# Patient Record
Sex: FEMALE | Race: WHITE | NOT HISPANIC OR LATINO | Employment: OTHER | URBAN - METROPOLITAN AREA
[De-identification: names, ages, dates, MRNs, and addresses within clinical notes are randomized per-mention and may not be internally consistent; named-entity substitution may affect disease eponyms.]

---

## 2017-03-29 ENCOUNTER — AMBULATORY - HEALTHEAST (OUTPATIENT)
Dept: CARDIOLOGY | Facility: CLINIC | Age: 82
End: 2017-03-29

## 2017-04-03 ENCOUNTER — OFFICE VISIT - HEALTHEAST (OUTPATIENT)
Dept: CARDIOLOGY | Facility: CLINIC | Age: 82
End: 2017-04-03

## 2017-04-03 ENCOUNTER — AMBULATORY - HEALTHEAST (OUTPATIENT)
Dept: CARDIOLOGY | Facility: CLINIC | Age: 82
End: 2017-04-03

## 2017-04-03 DIAGNOSIS — I63.9 CEREBROVASCULAR ACCIDENT (CVA), UNSPECIFIED MECHANISM (H): ICD-10-CM

## 2017-04-03 DIAGNOSIS — I10 ESSENTIAL HYPERTENSION: ICD-10-CM

## 2017-04-03 DIAGNOSIS — I48.0 PAROXYSMAL ATRIAL FIBRILLATION (H): ICD-10-CM

## 2017-04-03 ASSESSMENT — MIFFLIN-ST. JEOR: SCORE: 1031.95

## 2017-06-16 ENCOUNTER — AMBULATORY - HEALTHEAST (OUTPATIENT)
Dept: VASCULAR SURGERY | Facility: CLINIC | Age: 82
End: 2017-06-16

## 2017-06-16 DIAGNOSIS — I65.23 CAROTID STENOSIS, BILATERAL: ICD-10-CM

## 2017-06-19 ENCOUNTER — COMMUNICATION - HEALTHEAST (OUTPATIENT)
Dept: CARDIOLOGY | Facility: CLINIC | Age: 82
End: 2017-06-19

## 2017-06-19 DIAGNOSIS — I48.91 ATRIAL FIBRILLATION (H): ICD-10-CM

## 2017-06-20 ENCOUNTER — RECORDS - HEALTHEAST (OUTPATIENT)
Dept: VASCULAR ULTRASOUND | Facility: CLINIC | Age: 82
End: 2017-06-20

## 2017-06-20 ENCOUNTER — RECORDS - HEALTHEAST (OUTPATIENT)
Dept: ADMINISTRATIVE | Facility: OTHER | Age: 82
End: 2017-06-20

## 2017-06-20 DIAGNOSIS — I65.23 OCCLUSION AND STENOSIS OF BILATERAL CAROTID ARTERIES: ICD-10-CM

## 2017-06-21 ENCOUNTER — COMMUNICATION - HEALTHEAST (OUTPATIENT)
Dept: CARDIOLOGY | Facility: CLINIC | Age: 82
End: 2017-06-21

## 2017-06-29 ENCOUNTER — COMMUNICATION - HEALTHEAST (OUTPATIENT)
Dept: VASCULAR SURGERY | Facility: CLINIC | Age: 82
End: 2017-06-29

## 2017-07-13 ENCOUNTER — AMBULATORY - HEALTHEAST (OUTPATIENT)
Dept: VASCULAR SURGERY | Facility: CLINIC | Age: 82
End: 2017-07-13

## 2018-02-02 ENCOUNTER — COMMUNICATION - HEALTHEAST (OUTPATIENT)
Dept: ADMINISTRATIVE | Facility: CLINIC | Age: 83
End: 2018-02-02

## 2018-02-23 ENCOUNTER — RECORDS - HEALTHEAST (OUTPATIENT)
Dept: LAB | Facility: CLINIC | Age: 83
End: 2018-02-23

## 2018-02-23 LAB
ALBUMIN SERPL-MCNC: 3.4 G/DL (ref 3.5–5)
ALP SERPL-CCNC: 79 U/L (ref 45–120)
ALT SERPL W P-5'-P-CCNC: 12 U/L (ref 0–45)
ANION GAP SERPL CALCULATED.3IONS-SCNC: 12 MMOL/L (ref 5–18)
AST SERPL W P-5'-P-CCNC: 16 U/L (ref 0–40)
BILIRUB SERPL-MCNC: 0.3 MG/DL (ref 0–1)
BUN SERPL-MCNC: 26 MG/DL (ref 8–28)
CALCIUM SERPL-MCNC: 9.9 MG/DL (ref 8.5–10.5)
CHLORIDE BLD-SCNC: 101 MMOL/L (ref 98–107)
CHOLEST SERPL-MCNC: 211 MG/DL
CO2 SERPL-SCNC: 27 MMOL/L (ref 22–31)
CREAT SERPL-MCNC: 0.81 MG/DL (ref 0.6–1.1)
FASTING STATUS PATIENT QL REPORTED: NO
GFR SERPL CREATININE-BSD FRML MDRD: >60 ML/MIN/1.73M2
GLUCOSE BLD-MCNC: 93 MG/DL (ref 70–125)
HDLC SERPL-MCNC: 51 MG/DL
LDLC SERPL CALC-MCNC: 109 MG/DL
POTASSIUM BLD-SCNC: 3.6 MMOL/L (ref 3.5–5)
PROT SERPL-MCNC: 7.4 G/DL (ref 6–8)
SODIUM SERPL-SCNC: 140 MMOL/L (ref 136–145)
TRIGL SERPL-MCNC: 253 MG/DL

## 2018-03-16 ENCOUNTER — AMBULATORY - HEALTHEAST (OUTPATIENT)
Dept: CARDIOLOGY | Facility: CLINIC | Age: 83
End: 2018-03-16

## 2018-03-16 ENCOUNTER — RECORDS - HEALTHEAST (OUTPATIENT)
Dept: ADMINISTRATIVE | Facility: OTHER | Age: 83
End: 2018-03-16

## 2018-03-21 ENCOUNTER — OFFICE VISIT - HEALTHEAST (OUTPATIENT)
Dept: CARDIOLOGY | Facility: CLINIC | Age: 83
End: 2018-03-21

## 2018-03-21 DIAGNOSIS — I48.20 CHRONIC ATRIAL FIBRILLATION (H): ICD-10-CM

## 2018-03-21 DIAGNOSIS — Z79.01 ANTICOAGULANT LONG-TERM USE: ICD-10-CM

## 2018-03-21 DIAGNOSIS — I10 ACCELERATED HYPERTENSION: ICD-10-CM

## 2018-03-21 ASSESSMENT — MIFFLIN-ST. JEOR: SCORE: 1068.24

## 2018-08-07 ENCOUNTER — ANESTHESIA - HEALTHEAST (OUTPATIENT)
Dept: SURGERY | Facility: CLINIC | Age: 83
End: 2018-08-07

## 2018-08-08 ENCOUNTER — RECORDS - HEALTHEAST (OUTPATIENT)
Dept: LAB | Facility: CLINIC | Age: 83
End: 2018-08-08

## 2018-08-08 LAB
ANION GAP SERPL CALCULATED.3IONS-SCNC: 10 MMOL/L (ref 5–18)
BUN SERPL-MCNC: 23 MG/DL (ref 8–28)
CALCIUM SERPL-MCNC: 9.7 MG/DL (ref 8.5–10.5)
CHLORIDE BLD-SCNC: 108 MMOL/L (ref 98–107)
CO2 SERPL-SCNC: 24 MMOL/L (ref 22–31)
CREAT SERPL-MCNC: 0.84 MG/DL (ref 0.6–1.1)
GFR SERPL CREATININE-BSD FRML MDRD: >60 ML/MIN/1.73M2
GLUCOSE BLD-MCNC: 78 MG/DL (ref 70–125)
POTASSIUM BLD-SCNC: 4.6 MMOL/L (ref 3.5–5)
SODIUM SERPL-SCNC: 142 MMOL/L (ref 136–145)

## 2018-08-22 ASSESSMENT — MIFFLIN-ST. JEOR: SCORE: 1068.24

## 2018-08-24 ENCOUNTER — SURGERY - HEALTHEAST (OUTPATIENT)
Dept: SURGERY | Facility: CLINIC | Age: 83
End: 2018-08-24

## 2018-08-24 ASSESSMENT — MIFFLIN-ST. JEOR
SCORE: 1069.94
SCORE: 1068.24

## 2018-11-05 ENCOUNTER — RECORDS - HEALTHEAST (OUTPATIENT)
Dept: ADMINISTRATIVE | Facility: OTHER | Age: 83
End: 2018-11-05

## 2018-11-10 ENCOUNTER — HOSPITAL ENCOUNTER (OUTPATIENT)
Dept: MRI IMAGING | Facility: CLINIC | Age: 83
Discharge: HOME | End: 2018-11-10
Attending: OTOLARYNGOLOGY

## 2018-11-10 DIAGNOSIS — R51.9 HA (HEADACHE): ICD-10-CM

## 2018-11-10 DIAGNOSIS — R42 DIZZINESS: ICD-10-CM

## 2018-11-10 DIAGNOSIS — R55 SYNCOPE: ICD-10-CM

## 2018-11-10 LAB
CREAT BLD-MCNC: 0.8 MG/DL
CREAT BLD-MCNC: 0.8 MG/DL (ref 0.6–1.1)
POC GFR AMER AF HE - HISTORICAL: >60 ML/MIN/1.73M2
POC GFR NON AMER AF HE - HISTORICAL: >60 ML/MIN/1.73M2

## 2019-01-10 ENCOUNTER — RECORDS - HEALTHEAST (OUTPATIENT)
Dept: ADMINISTRATIVE | Facility: OTHER | Age: 84
End: 2019-01-10

## 2019-02-07 ENCOUNTER — COMMUNICATION - HEALTHEAST (OUTPATIENT)
Dept: ADMINISTRATIVE | Facility: CLINIC | Age: 84
End: 2019-02-07

## 2019-06-05 ENCOUNTER — RECORDS - HEALTHEAST (OUTPATIENT)
Dept: LAB | Facility: CLINIC | Age: 84
End: 2019-06-05

## 2019-06-05 LAB
MAGNESIUM SERPL-MCNC: 1.9 MG/DL (ref 1.8–2.6)
VIT B12 SERPL-MCNC: 236 PG/ML (ref 213–816)

## 2019-09-10 ENCOUNTER — RECORDS - HEALTHEAST (OUTPATIENT)
Dept: LAB | Facility: CLINIC | Age: 84
End: 2019-09-10

## 2019-09-11 LAB — BACTERIA SPEC CULT: NO GROWTH

## 2019-10-15 ENCOUNTER — RECORDS - HEALTHEAST (OUTPATIENT)
Dept: LAB | Facility: CLINIC | Age: 84
End: 2019-10-15

## 2019-10-15 LAB
ALBUMIN SERPL-MCNC: 4 G/DL (ref 3.5–5)
ALP SERPL-CCNC: 71 U/L (ref 45–120)
ALT SERPL W P-5'-P-CCNC: 17 U/L (ref 0–45)
ANION GAP SERPL CALCULATED.3IONS-SCNC: 12 MMOL/L (ref 5–18)
AST SERPL W P-5'-P-CCNC: 24 U/L (ref 0–40)
BILIRUB SERPL-MCNC: 0.3 MG/DL (ref 0–1)
BUN SERPL-MCNC: 21 MG/DL (ref 8–28)
CALCIUM SERPL-MCNC: 9.3 MG/DL (ref 8.5–10.5)
CHLORIDE BLD-SCNC: 107 MMOL/L (ref 98–107)
CHOLEST SERPL-MCNC: 230 MG/DL
CO2 SERPL-SCNC: 22 MMOL/L (ref 22–31)
CREAT SERPL-MCNC: 1.03 MG/DL (ref 0.6–1.1)
FASTING STATUS PATIENT QL REPORTED: NO
GFR SERPL CREATININE-BSD FRML MDRD: 51 ML/MIN/1.73M2
GLUCOSE BLD-MCNC: 94 MG/DL (ref 70–125)
HDLC SERPL-MCNC: 63 MG/DL
LDLC SERPL CALC-MCNC: 134 MG/DL
POTASSIUM BLD-SCNC: 4.7 MMOL/L (ref 3.5–5)
PROT SERPL-MCNC: 7.6 G/DL (ref 6–8)
SODIUM SERPL-SCNC: 141 MMOL/L (ref 136–145)
TRIGL SERPL-MCNC: 165 MG/DL

## 2019-11-08 ENCOUNTER — ANESTHESIA - HEALTHEAST (OUTPATIENT)
Dept: SURGERY | Facility: CLINIC | Age: 84
End: 2019-11-08

## 2019-11-08 ENCOUNTER — SURGERY - HEALTHEAST (OUTPATIENT)
Dept: SURGERY | Facility: CLINIC | Age: 84
End: 2019-11-08

## 2019-11-08 ASSESSMENT — MIFFLIN-ST. JEOR: SCORE: 1077.31

## 2020-11-13 ENCOUNTER — RECORDS - HEALTHEAST (OUTPATIENT)
Dept: LAB | Facility: CLINIC | Age: 85
End: 2020-11-13

## 2020-11-13 LAB
ALBUMIN SERPL-MCNC: 4 G/DL (ref 3.5–5)
ALP SERPL-CCNC: 72 U/L (ref 45–120)
ALT SERPL W P-5'-P-CCNC: 13 U/L (ref 0–45)
ANION GAP SERPL CALCULATED.3IONS-SCNC: 12 MMOL/L (ref 5–18)
AST SERPL W P-5'-P-CCNC: 20 U/L (ref 0–40)
BILIRUB SERPL-MCNC: 0.5 MG/DL (ref 0–1)
BUN SERPL-MCNC: 18 MG/DL (ref 8–28)
CALCIUM SERPL-MCNC: 9.8 MG/DL (ref 8.5–10.5)
CHLORIDE BLD-SCNC: 105 MMOL/L (ref 98–107)
CO2 SERPL-SCNC: 25 MMOL/L (ref 22–31)
CREAT SERPL-MCNC: 1 MG/DL (ref 0.6–1.1)
GFR SERPL CREATININE-BSD FRML MDRD: 53 ML/MIN/1.73M2
GLUCOSE BLD-MCNC: 104 MG/DL (ref 70–125)
POTASSIUM BLD-SCNC: 5.5 MMOL/L (ref 3.5–5)
PROT SERPL-MCNC: 7.4 G/DL (ref 6–8)
SODIUM SERPL-SCNC: 142 MMOL/L (ref 136–145)

## 2020-11-14 LAB — BACTERIA SPEC CULT: NORMAL

## 2021-02-23 ASSESSMENT — MIFFLIN-ST. JEOR: SCORE: 1068.8

## 2021-03-01 ENCOUNTER — VIRTUAL VISIT (OUTPATIENT)
Dept: PHARMACY | Facility: PHYSICIAN GROUP | Age: 86
End: 2021-03-01
Payer: COMMERCIAL

## 2021-03-01 VITALS
BODY MASS INDEX: 22.66 KG/M2 | HEIGHT: 65 IN | WEIGHT: 136 LBS | SYSTOLIC BLOOD PRESSURE: 122 MMHG | HEART RATE: 72 BPM | DIASTOLIC BLOOD PRESSURE: 80 MMHG

## 2021-03-01 DIAGNOSIS — J44.9 CHRONIC OBSTRUCTIVE PULMONARY DISEASE, UNSPECIFIED COPD TYPE (H): ICD-10-CM

## 2021-03-01 DIAGNOSIS — M81.0 OSTEOPOROSIS, UNSPECIFIED OSTEOPOROSIS TYPE, UNSPECIFIED PATHOLOGICAL FRACTURE PRESENCE: ICD-10-CM

## 2021-03-01 DIAGNOSIS — I10 ESSENTIAL HYPERTENSION: ICD-10-CM

## 2021-03-01 DIAGNOSIS — F33.42 MAJOR DEPRESSIVE DISORDER, RECURRENT EPISODE, IN FULL REMISSION (H): ICD-10-CM

## 2021-03-01 DIAGNOSIS — I48.0 PAROXYSMAL ATRIAL FIBRILLATION (H): ICD-10-CM

## 2021-03-01 DIAGNOSIS — Z78.9 TAKES DIETARY SUPPLEMENTS: ICD-10-CM

## 2021-03-01 DIAGNOSIS — R25.1 TREMOR: Primary | ICD-10-CM

## 2021-03-01 PROCEDURE — 99605 MTMS BY PHARM NP 15 MIN: CPT | Mod: TEL | Performed by: PHARMACIST

## 2021-03-01 PROCEDURE — 99607 MTMS BY PHARM ADDL 15 MIN: CPT | Mod: TEL | Performed by: PHARMACIST

## 2021-03-01 RX ORDER — ALENDRONATE SODIUM 70 MG/1
TABLET ORAL
COMMUNITY

## 2021-03-01 RX ORDER — BUDESONIDE AND FORMOTEROL FUMARATE DIHYDRATE 80; 4.5 UG/1; UG/1
2 AEROSOL RESPIRATORY (INHALATION) 2 TIMES DAILY PRN
COMMUNITY

## 2021-03-01 NOTE — PROGRESS NOTES
Medication Therapy Management (MTM) Encounter    ASSESSMENT:                            Medication Adherence/Access: No issues identified    Tremor: Currently taking low dose recommended for restless leg syndrome. Give patient's daytime tremor she may benefit from increasing dose frequency.     Paroxysmal atrial fibrillation: Last INR was at goal of 2-3. Educated patient to call clinic if bleeding from ear lobe wound does not improve or worsens.     Hypertension: Patient is meeting BP goal of < 140/90mmHg. She may benefit from moving lisinopril dose to evening instead of morning to see if this helps reduce daytime fatigue and dizziness.     Depression: Stable.     COPD: Stable.     Osteoporosis: Stable. Patient is meeting RDI of calcium 1200mg/day and is meeting RDI of Vitamin D 1000 IU/day. Last vitamin D level in 2013 was at lower end of recommended range. She may benefit from repeating vitamin D level to ensure current supplement is sufficient. Long-term bisphosphonate therapy is appropriate after stopping Prolia. Due for repeat DEXA scan.     Supplements: Stable.      PLAN:                            1. Move lisinopril to night   2. Increase pramipexole to 1 tablet in the morning and continue 1 tablet at bedtime, if this is helpful we may add another tablet at dinner time.  3. Due for repeat DEXA scan. Consider rechecking vitamin D level to make sure current vitamin is enough.   4. Continue taking all other medications as prescribed    Follow-up: Recommended in 1 month, insurance plan does not currently cover MTM visits but may start to later this year.     SUBJECTIVE/OBJECTIVE:                          Jori Humphreys is a 85 year old female called for an initial visit. She was referred to me from Filemon Alexis PA-C.      Reason for visit: Comprehensive medication review.    Allergies/ADRs: Reviewed in chart  Tobacco: She reports that she quit smoking about 4 years ago. Her smoking use included cigarettes. She  has a 30.00 pack-year smoking history. She has never used smokeless tobacco.  Alcohol: Less than 1 beverage / month  Caffeine: 3 cups/day of coffee  Past Medical History: Reviewed in chart    Medication Adherence/Access: no issues reported    Tremor:   Current medication: pramipexole 0.125 mg at bedtime  Patient had previously followed with neurology for tremors but it has been many years. Her tremors bother her most when she is trying to do tasks and occur mostly during the day. She started pramixole after her sister suggested it may help since she takes it for restless legs and it works well for her. She is currently taking one 0.125 mg tablet at bedtime but at her last doctor appt it was suggested she increase the dose. She was worried about the safety of doing this and wanted to talk about this today.     Afib  Current medications:  Antithrombotic: warfarin 5 mg Mon, Sun, Wed, Fri, Sat and 4 mg Tues, Thurs  Rate/Rhythm Control: none  Statin: none   Patient reports minor bruising. Woke up today with blood on her pillow, noticed she had a scratch on her ear. Bled for about 2 hours before it stopped. She almost went to clinic but was able to get it to stop with cotton ball and tape.  Patient does not have a hx of GI bleed.     Per Cardiology Consult Note from Dr. Mckinley  in 2017:   Anticoagulation:   Stroke risk: [id number] Score:6 (HTN, Age, Female, prior CVA) HAS-BLED:3   Patient's stroke risk per year with no therapy: 12.5%, ASA only: 9.8%, Warfarin 4.1%, Eliquis: 3.3%.   Major bleeding risk with warfain: 5.6% per year, Eliquis is 3.9% per year.     A [id number]-DCDm score of greater than 1 in males and 2 in females is an indication for long term oral anticoagulation. After informative discussion regarding indication for embolic stroke prevention and risk of anticoagulation for stroke prevention in atrial fibrillation patient is agreeable to listed recommendations below. Risk include minor or major bleeding  "including life threatening bleeding, GI symptoms and drug interactions.     Recommend:  - Eliquis (apixaban) 2.5 mg PO BID (age and WT).   - Heart rate was well controlled on recent Holter. Aveage HR: 70 bpm.            Hypertension:   Current medications: lisinopril 20 mg daily in the morning   Patient does not self-monitor blood pressure.  Patient reports the following medication side effects: fatigue and occasional dizziness.     Osteoporosis:   Current therapy includes:   Calcium 600 mg/Vitamin D 500 units twice a day   Alendronate (Fosamax) 70mg weekly   Previous therapy includes Prolia.     She is not experiencing side effects.  Risk factors: post-menopausal  Last DEXA 2/23/2017  Last vitamin D level, 10/4/2013- 39.6 ng/mL    COPD:   Current medications:   ICS/LABA- Symbicort as needed  Patient rinses their mouth after using steroid inhaler.    Patient is not experiencing side effects. She has not experienced any issues with cost.  Patient reports the following symptoms: none.  Patient does not have an COPD Action Plan on file.   Has spirometry been completed: Doesn't know    Depression:    Current medications: Sertraline 25 mg once daily.   Patient has been on sertraline for many years and finds it really helpful for her. She has never noticed any side effects from it.     Supplements:   Currently taking:  Preservision 1 tablet twice a day   Calcium/Vitamin D 1 tablet twice a day   She has not had any issues with these and is okay with continuing current supplements.       Last Clinic Vitals: 2/23/2021- /80  Pulse 72   Ht 5' 4.75\" (1.645 m)   Wt 136 lb (61.7 kg)   BMI 22.81 kg/m    ----------------    I spent 26 minutes with this patient today (out of pocket cost for today's MTM visit was reviewed with the patient). All changes were made via collaborative practice agreement with Filemon Alexis PA-C. A copy of the visit note was provided to the patient's primary care provider.    The patient was " mailed a summary of these recommendations.     Alexandra Moise, PharmD, BCACP  Medication Therapy Management Pharmacist  [facility]  Pager: [phone]      Telemedicine Visit Details  Type of service:  Telephone visit  Start Time: 3:25 PM  End Time: 3:51 PM  Originating Location (patient location): Home  Distant Location (provider location):  [facility]        Medication Therapy Recommendations  Essential hypertension    Current Medication: lisinopril (ZESTRIL) 20 MG tablet   Rationale: Undesirable effect - Adverse medication event - Safety   Recommendation: Provide Education - Move lisinopril to night   Status: Patient Agreed - Adherence/Education         Tremor    Current Medication: pramipexole (MIRAPEX) 0.125 MG tablet   Rationale: Dose too low - Dosage too low - Effectiveness   Recommendation: Increase Frequency - Increase pramipexole to 1 tablet in the morning and continue 1 tablet at bedtime, if this is helpful we may add another tablet at dinner time.   Status: Accepted per Provider             Medications   Taking Symbicort 80-4.5 MCG/ACT Aerosol, Si puff(s) inhaled 2 times a day Start Date: 09/10/2019   Continue Mirapex 0.125 MG Tablet, Si tab(s) orally 30 minuutes before bedtime   Taking Sertraline HCl 25 MG Tablet, Si tab(s) orally once a day   Continue Warfarin Sodium 5 MG Tablet, Si tablet Orally Once a day 5 days a week   Taking Refresh Optive 0.5-0.9 % Solution, Si gtt(s) in each eye as needed   Continue Warfarin Sodium 4 MG Tablet, Si tab(s) orally  Start Date: 2020   Taking PreserVision AREDS - Tablet, Si tab(s) orally twice a day   Continue Lisinopril 20 MG Tablet, Si tab(s) orally once a day   Taking Calcium 500 MG Tablet, Si tab(s) twice a day   Taking Alendronate Sodium 70 MG Tablet, Si tab(s) orally once a week

## 2021-03-04 RX ORDER — PRAMIPEXOLE DIHYDROCHLORIDE 0.12 MG/1
0.12 TABLET ORAL 3 TIMES DAILY
COMMUNITY
End: 2022-09-03

## 2021-03-04 RX ORDER — WARFARIN SODIUM 5 MG/1
5 TABLET ORAL DAILY
COMMUNITY
End: 2022-09-03

## 2021-03-04 RX ORDER — WARFARIN SODIUM 4 MG/1
4 TABLET ORAL DAILY
Status: ON HOLD | COMMUNITY
End: 2022-09-08

## 2021-03-08 NOTE — PATIENT INSTRUCTIONS
Recommendations from today's MTM visit:                                                    MTM (medication therapy management) is a service provided by a clinical pharmacist designed to help you get the most of out of your medicines.   Today we reviewed what your medicines are for, how to know if they are working, that your medicines are safe and how to make your medicine regimen as easy as possible.     1. Move lisinopril to night to see if that helps with blood pressure and decreases daytime sleepiness    2. Increase pramipexole to 1 tablet in the morning and continue 1 tablet at bedtime, if this is helpful we may add another tablet at dinner time.    3. You are due for repeat DEXA scan to check your bone density. Your doctor may also want to check a vitamin D level to make sure current vitamin is enough.     4. Continue taking all other medications as prescribed    It was great to speak with you today.  I value your experience and would be very thankful for your time with providing feedback on our clinic survey. You may receive a survey via email or text message in the next few days.     Next MTM visit: Recommended in 1-2 months to see how increase in pramipexole dose is working for your tremor.     Your insurance plan does not currently cover MTM visits but may start to later this year. We could wait until that time if you are concerned about cost for MTM visit.     To schedule another MTM appointment, please call the clinic directly or you may call the MTM scheduling line at [phone] or toll-free at [phone].     My Clinical Pharmacist's contact information:                                                      It was a pleasure talking with you today!  Please feel free to contact me with any questions or concerns you have.      Alexandra Moise, PharmD  Medication Therapy Management Pharmacist  [facility]  Pager: [phone]         Medication List       Accurate as of March 1, 2021 11:59  PM. If you have any questions, ask your nurse or doctor.             alendronate 70 MG tablet  Also known as: FOSAMAX  Take 70 mg by mouth every 7 days. Take in the morning on an empty stomach with a full glass of water 30 minutes before food  Reason for med: Osteoporosis     CALCIUM 600 mg+ Vitamin D 500 units tablet  Take 1 tablet by mouth 2 times daily  Reason for med: Osteoporosis     lisinopril 20 MG tablet  Also known as: ZESTRIL  Take 20 mg by mouth every evening  Reason for med: High Blood Pressure      pramipexole 0.125 MG tablet  Also known as: MIRAPEX  Take 0.125 mg by mouth up to 3 times daily  Reason for med: Tremor      PRESERVISION AREDS PO  Take 1 tablet by mouth 2 times daily  Reason for med: Eye Health     REFRESH eye drop  Apply 1 drop to eye as needed.  Reason for med: Dry eyes     Symbicort 80-4.5 MCG/ACT Inhaler  Generic drug: budesonide-formoterol  Inhale 2 puffs into the lungs 2 times daily as needed for shortness of breath or trouble breathing  Reason for med: Chronic Obstructive Lung Disease     warfarin ANTICOAGULANT 5 MG tablet  Also known as: COUMADIN  As directed based on INR.   Current dose: Take 5 mg by mouth daily , on Sunday, Monday, Wednesday, Friday and Saturday  Reason for med: Prevent a blood clot and stroke from Afib      warfarin ANTICOAGULANT 4 MG tablet  Also known as: COUMADIN  As directed based on INR. Current dose: Take 4 mg by mouth daily , on Tuesday and Thursday   Reason for med: Prevent blood clot and stroke from Afib      sertraline 25 MG tablet  Also known as: Zoloft  Take 25 mg by mouth daily  Reason for med: Mood and Anxiety

## 2021-04-20 ENCOUNTER — COMMUNICATION - HEALTHEAST (OUTPATIENT)
Dept: SCHEDULING | Facility: CLINIC | Age: 86
End: 2021-04-20

## 2021-04-27 ENCOUNTER — RECORDS - HEALTHEAST (OUTPATIENT)
Dept: LAB | Facility: CLINIC | Age: 86
End: 2021-04-27

## 2021-04-27 LAB
ALBUMIN SERPL-MCNC: 3.6 G/DL (ref 3.5–5)
ALP SERPL-CCNC: 69 U/L (ref 45–120)
ALT SERPL W P-5'-P-CCNC: 56 U/L (ref 0–45)
ANION GAP SERPL CALCULATED.3IONS-SCNC: 13 MMOL/L (ref 5–18)
AST SERPL W P-5'-P-CCNC: 33 U/L (ref 0–40)
BILIRUB SERPL-MCNC: 0.7 MG/DL (ref 0–1)
BUN SERPL-MCNC: 26 MG/DL (ref 8–28)
CALCIUM SERPL-MCNC: 8.8 MG/DL (ref 8.5–10.5)
CHLORIDE BLD-SCNC: 109 MMOL/L (ref 98–107)
CO2 SERPL-SCNC: 19 MMOL/L (ref 22–31)
CREAT SERPL-MCNC: 0.81 MG/DL (ref 0.6–1.1)
GFR SERPL CREATININE-BSD FRML MDRD: >60 ML/MIN/1.73M2
GLUCOSE BLD-MCNC: 117 MG/DL (ref 70–125)
POTASSIUM BLD-SCNC: 3.8 MMOL/L (ref 3.5–5)
PROT SERPL-MCNC: 6.4 G/DL (ref 6–8)
SODIUM SERPL-SCNC: 141 MMOL/L (ref 136–145)

## 2021-05-30 VITALS — BODY MASS INDEX: 21.16 KG/M2 | WEIGHT: 127 LBS | HEIGHT: 65 IN

## 2021-05-31 ENCOUNTER — RECORDS - HEALTHEAST (OUTPATIENT)
Dept: ADMINISTRATIVE | Facility: CLINIC | Age: 86
End: 2021-05-31

## 2021-06-01 VITALS — BODY MASS INDEX: 22.49 KG/M2 | WEIGHT: 135 LBS | HEIGHT: 65 IN

## 2021-06-02 ENCOUNTER — RECORDS - HEALTHEAST (OUTPATIENT)
Dept: ADMINISTRATIVE | Facility: CLINIC | Age: 86
End: 2021-06-02

## 2021-06-03 VITALS — WEIGHT: 137 LBS | BODY MASS INDEX: 22.82 KG/M2 | HEIGHT: 65 IN

## 2021-06-03 NOTE — ANESTHESIA CARE TRANSFER NOTE
Last vitals:   Vitals:    11/08/19 1507   BP: 136/86   Pulse: 77   Resp: 16   Temp: 36.3  C (97.4  F)   SpO2: 94%     Patient's level of consciousness is awake  Spontaneous respirations: yes  Maintains airway independently: yes  Dentition unchanged: yes  Oropharynx: oropharynx clear of all foreign objects    QCDR Measures:  ASA# 20 - Surgical Safety Checklist: WHO surgical safety checklist completed prior to induction    PQRS# 430 - Adult PONV Prevention: 4558F - Pt received => 2 anti-emetic agents (different classes) preop & intraop  ASA# 8 - Peds PONV Prevention: NA - Not pediatric patient, not GA or 2 or more risk factors NOT present  PQRS# 424 - Ly-op Temp Management: 4559F - At least one body temp DOCUMENTED => 35.5C or 95.9F within required timeframe  PQRS# 426 - PACU Transfer Protocol: - Transfer of care checklist used  ASA# 14 - Acute Post-op Pain: ASA14B - Patient did NOT experience pain >= 7 out of 10

## 2021-06-03 NOTE — ANESTHESIA POSTPROCEDURE EVALUATION
Patient: Jori Humphreys  LEFT OPEN CARPAL TUNNEL RELEASE  Anesthesia type: MAC    Patient location: PACU  Last vitals:   Vitals Value Taken Time   /94 11/8/2019  3:30 PM   Temp 36  C (96.8  F) 11/8/2019  4:00 PM   Pulse 68 11/8/2019  4:06 PM   Resp 16 11/8/2019  4:00 PM   SpO2 97 % 11/8/2019  4:06 PM   Vitals shown include unvalidated device data.  Post vital signs: stable  Level of consciousness: awake and responds to simple questions  Post-anesthesia pain: pain controlled  Post-anesthesia nausea and vomiting: no  Pulmonary: unassisted, return to baseline  Cardiovascular: stable and blood pressure at baseline  Hydration: adequate  Anesthetic events: no    QCDR Measures:  ASA# 11 - Ly-op Cardiac Arrest: ASA11B - Patient did NOT experience unanticipated cardiac arrest  ASA# 12 - Ly-op Mortality Rate: ASA12B - Patient did NOT die  ASA# 13 - PACU Re-Intubation Rate: ASA13B - Patient did NOT require a new airway mgmt  ASA# 10 - Composite Anes Safety: ASA10A - No serious adverse event    Additional Notes:

## 2021-06-03 NOTE — ANESTHESIA PREPROCEDURE EVALUATION
"Anesthesia Evaluation      Patient summary reviewed   No history of anesthetic complications     Airway   Mallampati: II  Neck ROM: full   Pulmonary - normal exam    breath sounds clear to auscultation  (+) COPD (seems to be well controlled. Pt denies any symptoms, has good functional status, has not needed to use an inhaler. Some air trapping noted on CXR.), a smoker (Quit 2017)                         Cardiovascular   (+) hypertension, ,     ECG reviewed (NSR)  Rhythm: regular  Rate: normal,         Neuro/Psych    (+) neuromuscular disease (carpal tunnel),  CVA (No residual deficits, had two \"mini\" strokes previously) , depression,     Endo/Other - negative ROS      GI/Hepatic/Renal - negative ROS           Dental - normal exam                          Anesthesia Plan  Planned anesthetic: MAC  Pre-op: Avoid versed  Intra-op: propofol gtt  ASA 3   Induction: intravenous   Anesthetic plan and risks discussed with: patient    Post-op plan: routine recovery          "

## 2021-06-18 NOTE — LETTER
Letter by Javon Mckinley DO at      Author: Javon Mckinley DO Service: -- Author Type: --    Filed:  Encounter Date: 2/7/2019 Status: (Other)       Jori Humphreys  [address] [facility] [address]      February 7, 2019      Dear Jori,    This letter is to remind you that you will be due for your follow up appointment with Dr. Javon Mckinley  . To help ensure you are in the best health possible, a regular follow-up with your cardiologist is essential.     Please call our Patient Scheduling Line at [phone] to schedule your appointment at your earliest convenience.  If you have recently scheduled an appointment, please disregard this letter.    We look forward to seeing you again. As always, we are available at the number  above for any questions or concerns you may have.      Sincerely,     The Physicians and Staff of [facility]

## 2021-06-20 NOTE — ANESTHESIA PREPROCEDURE EVALUATION
Anesthesia Evaluation      Patient summary reviewed   No history of anesthetic complications     Airway   Mallampati: II  Neck ROM: full   Pulmonary - normal exam    breath sounds clear to auscultation  (+) COPD (seems to be well controlled. Pt denies any symptoms, has good functional status, has not needed to use an inhaler. Some air trapping noted on CXR.), a smoker (Quit 2017)                         Cardiovascular   (+) hypertension, ,     ECG reviewed  Rhythm: regular  Rate: normal,         Neuro/Psych    (+) neuromuscular disease,  CVA , depression,     Endo/Other - negative ROS      GI/Hepatic/Renal - negative ROS           Dental - normal exam                        Anesthesia Plan  Planned anesthetic: general endotracheal  Interscalene block  ASA 3   Induction: intravenous   Anesthetic plan and risks discussed with: patient  Anesthesia plan special considerations: antiemetics,   Post-op plan: routine recovery

## 2021-06-20 NOTE — ANESTHESIA POSTPROCEDURE EVALUATION
Patient: Jori Humphreys  LEFT SHOULDER ARTHROSCOPIC ROTATOR CUFF REPAIR, ARTHROSCOPIC DEBRIDEMENT AND SUBACHROMIAL DECOMPRESSION, LEFT MIDDLE TRIGGER FINGER RELEASE AND GANGLION CYST EXCISION  Anesthesia type: general    Patient location: PACU  Last vitals:   Vitals:    08/24/18 1550   BP: 143/75   Pulse:    Resp:    Temp:    SpO2:      Post vital signs: stable  Level of consciousness: awake and responds to simple questions  Post-anesthesia pain: pain controlled  Post-anesthesia nausea and vomiting: no  Pulmonary: unassisted, spontaneous ventilation, nasal cannula  Cardiovascular: stable and blood pressure at baseline  Hydration: adequate  Anesthetic events: no    QCDR Measures:  ASA# 11 - Ly-op Cardiac Arrest: ASA11B - Patient did NOT experience unanticipated cardiac arrest  ASA# 12 - Ly-op Mortality Rate: ASA12B - Patient did NOT die  ASA# 13 - PACU Re-Intubation Rate: ASA13B - Patient did NOT require a new airway mgmt  ASA# 10 - Composite Anes Safety: ASA10A - No serious adverse event    Additional Notes: good pain control secondary to blocks; no nausea.

## 2021-06-20 NOTE — ANESTHESIA CARE TRANSFER NOTE
Last vitals:   Vitals:    08/24/18 1502   BP: (!) 203/98   Pulse: 97   Resp: 21   Temp: 36.3  C (97.4  F)   SpO2: (!) 87%     Patient's level of consciousness is drowsy  Spontaneous respirations: yes  Maintains airway independently: yes  Dentition unchanged: yes  Oropharynx: oropharynx clear of all foreign objects    QCDR Measures:  ASA# 20 - Surgical Safety Checklist: WHO surgical safety checklist completed prior to induction  PQRS# 430 - Adult PONV Prevention: 4558F - Pt received => 2 anti-emetic agents (different classes) preop & intraop  ASA# 8 - Peds PONV Prevention: NA - Not pediatric patient, not GA or 2 or more risk factors NOT present  PQRS# 424 - Ly-op Temp Management: 4559F - At least one body temp DOCUMENTED => 35.5C or 95.9F within required timeframe  PQRS# 426 - PACU Transfer Protocol: - Transfer of care checklist used  ASA# 14 - Acute Post-op Pain: ASA14B - Patient did NOT experience pain >= 7 out of 10

## 2021-06-20 NOTE — ANESTHESIA PROCEDURE NOTES
Peripheral Block    Patient location during procedure: pre-op  Start time: 8/24/2018 12:16 PM  End time: 8/24/2018 12:19 PM  post-op analgesia per surgeon order as noted in medical record  Staffing:  Performing  Anesthesiologist: AMRIT BROWN  Preanesthetic Checklist  Completed: patient identified, site marked, risks, benefits, and alternatives discussed, timeout performed, consent obtained, at patient's request, airway assessed, oxygen available, suction available, emergency drugs available and hand hygiene performed  Peripheral Block  Block type: brachial plexus, interscalene  Prep: ChloraPrep  Patient position: supine  Patient monitoring: cardiac monitor, continuous pulse oximetry, heart rate and blood pressure  Laterality: left  Injection technique: ultrasound guided    Ultrasound used to visualize needle placement in proximity to nerve being blocked: yes   Permanent ultrasound image captured for medical record  Sterile gel and probe cover used for ultrasound.    Needle  Needle type: Stimuplex   Needle gauge: 20G  Needle length: 4 in  no peripheral nerve catheter placed  Assessment  Injection assessment: no difficulty with injection, negative aspiration for heme, no paresthesia on injection and incremental injection

## 2021-06-25 NOTE — PROGRESS NOTES
Progress Notes by Javon Mckinley DO at 4/3/2017  2:30 PM     Author: Javon Mckinley DO Service: -- Author Type: Physician    Filed: 4/3/2017  5:40 PM Encounter Date: 4/3/2017 Status: Signed    : Javon Mckinley DO (Physician)           Click to link to [facility] HEART CARE NOTE    Thank you, Dr. Martin, for asking the [facility] Heart Care team to see Ms. Jori Humphreys to evaluate her excisional atrial fibrillation.      Assessment/Recommendations   Assessment:    1.  Paroxysmal Atrial Fibrillation:  Anticoagulation:   Stroke risk: [id number] Score:6 (HTN, Age, Female, prior CVA)  HAS-BLED:3     Patient's stroke risk per year with no therapy: 12.5%, ASA only: 9.8%, Warfarin 4.1%, Eliquis: 3.3%.    Major bleeding risk with warfain: 5.6% per year, Eliquis is 3.9% per year.     A LRP6Y6-HAKi score of greater than 1 in males and 2 in females is an indication for long term oral anticoagulation.  After informative discussion regarding indication for embolic stroke prevention and risk of anticoagulation for stroke prevention in atrial fibrillation patient is agreeable to listed recommendations below.  Risk include minor or major bleeding including life threatening bleeding, GI symptoms and drug interactions.     Recommend:  - Eliquis (apixaban) 2.5 mg PO BID (age and WT).    - Heart rate was well controlled on recent Holter.  Aveage HR: 70 bpm.         History of Present Illness    Ms. Jori Humhpreys is a 81 y.o. female with recently diagnosed atrial fibrillation who presents in consultation for atrial fib.  Patient was recently evaluated at [facility] where he was noted that she had an irregular heart rate.  Given this she underwent Holter monitoring which demonstrated frequent episodes of paroxysmal atrial fibrillation.  Patient states that she has felt rare episodes of palpitations for many years.  She also has history of 2  cerebrovascular accidents.  She was started on Eliquis 2.5 mg twice daily given her age and weight and has tolerated this for the past few days.  Holter monitoring (report reviewed) also demonstrated well-controlled rate and current medical regimen.  Patient has undergone echocardiogram which is scanned under media tab from [facility].  Echo results were reviewed.       Physical Examination Review of Systems   Vitals:    04/03/17 1500   BP: 120/72   Pulse: 60   Resp: 18     Body mass index is 21.13 kg/(m^2).  Wt Readings from Last 3 Encounters:   04/03/17 127 lb (57.6 kg)   06/17/16 124 lb (56.2 kg)   06/03/16 124 lb (56.2 kg)       General Appearance:   no distress, normal body habitus   ENT/Mouth: membranes moist, no oral lesions or bleeding gums.      EYES:  no scleral icterus, normal conjunctivae   Neck: no carotid bruits or thyromegaly   Chest/Lungs:   lungs are clear to auscultation, no rales or wheezing, no sternal scar, equal chest wall expansion    Cardiovascular:   Regular with rare ectopic beats. . Normal first and second heart sounds with no murmurs, rubs, or gallops; the carotid, radial and posterior tibial pulses are intact, Jugular venous pressure normal, no edema bilaterally    Abdomen:  no organomegaly, masses, bruits, or tenderness; bowel sounds are present   Extremities: no cyanosis or clubbing   Skin: no xanthelasma, warm.    Neurologic: normal gait, normal  bilateral, no tremors     Psychiatric: alert and oriented x3, calm     General: WNL  Eyes: WNL  Ears/Nose/Throat: WNL  Lungs: WNL  Heart: WNL  Stomach: WNL  Bladder: WNL  Muscle/Joints: WNL  Skin: WNL  Nervous System: WNL  Mental Health: WNL     Blood: WNL     Medical History  Surgical History Family History Social History   Past Medical History:   Diagnosis Date   ? Arthritis    ? Atrial fibrillation    ? Cancer 2008    Hx colon cancer   ? COPD (chronic obstructive pulmonary disease)    ? Depression    ? Eye pain    ? Hypertension    ?  Nicotine dependence     Past Surgical History:   Procedure Laterality Date   ? CARPAL TUNNEL RELEASE  2011   ? CATARACT EXTRACTION Bilateral    ? Colon cancer removed  2008   ? KNEE SURGERY Left 1997   ? [place] TEMPORAL ARTERY LIGATN OR BX Bilateral 12/10/2014    Procedure: TEMPORAL ARTERY BIOPSY BILATERAL;  Surgeon: Modesta Wilkerson MD;  Location: [place] [facility];  Service: ENT   ? ROTATOR CUFF REPAIR     ? TRIGGER FINGER RELEASE      No family history on file. Social History     Social History   ? Marital status: Single     Spouse name: N/A   ? Number of children: N/A   ? Years of education: N/A     Occupational History   ? Not on file.     Social History Main Topics   ? Smoking status: Former Smoker     Packs/day: 0.50   ? Smokeless tobacco: Never Used   ? Alcohol use 1.5 oz/week     3 Standard drinks or equivalent per week   ? Drug use: No   ? Sexual activity: Not on file     Other Topics Concern   ? Not on file     Social History Narrative          Medications  Allergies   Current Outpatient Prescriptions   Medication Sig Dispense Refill   ? apixaban (ELIQUIS) 2.5 mg Tab tablet Take by mouth 2 (two) times a day.     ? calcium carbonate-vitamin D2 500 mg(1,250mg) -200 unit tablet Take 1 tablet by mouth 2 (two) times a day.     ? carboxymethylcellulose-glycerin (REFRESH OPTIVE) 0.5-0.9 % Drop 1 drop 2 (two) times a day as needed.     ? cyanocobalamin (VITAMIN B-12) 1000 MCG tablet Take 1,000 mcg by mouth daily.     ? denosumab 60 mg/mL Syrg Inject 60 mg under the skin every 6 (six) months.     ? lisinopril (PRINIVIL,ZESTRIL) 5 MG tablet Take 5 mg by mouth daily.     ? risedronate (ACTONEL) 35 MG tablet Take 35 mg by mouth every 7 days. with water on empty stomach, nothing by mouth or lie down for next 30 minutes.     ? sertraline (ZOLOFT) 25 MG tablet Take 25 mg by mouth daily.     ? triamterene-hydrochlorothiazide (MAXZIDE-25) 37.5-25 mg per tablet daily     ? vitamin A-vitamin C-vit E-min (OCUVITE) Tab  tablet Take 1 tablet by mouth daily.     ? aspirin 81 MG EC tablet Take 81 mg by mouth daily.     ? sertraline (ZOLOFT) 50 MG tablet TK ONE T PO QD  0   ? triamterene-hydrochlorothiazide (DYAZIDE) 37.5-25 mg per capsule Take 1 capsule by mouth every morning.       No current facility-administered medications for this visit.       Allergies   Allergen Reactions   ? Actonel [Risedronate] Nausea Only   ? Fosamax [Alendronate] Nausea Only         Lab Results    Chemistry/lipid CBC Cardiac Enzymes/BNP/TSH/INR   Lab Results   Component Value Date    CREATININE 0.78 02/24/2017    BUN 22 02/24/2017    K 3.9 02/24/2017     02/24/2017     02/24/2017    CO2 26 02/24/2017    Lab Results   Component Value Date    WBC 8.5 03/19/2015    HGB 13.7 03/19/2015    HCT 40.9 03/19/2015    MCV 96 03/19/2015     03/19/2015    Lab Results   Component Value Date    TSH 1.28 02/19/2015

## 2021-06-26 NOTE — PROGRESS NOTES
Progress Notes by Javon Mckinley DO at 3/21/2018  1:10 PM     Author: Javon Mckinley DO Service: -- Author Type: Physician    Filed: 3/21/2018  2:01 PM Encounter Date: 3/21/2018 Status: Signed    : Javon Mckinley DO (Physician)           Click to link to [facility] Heart [facility] HEART [facility] NOTE    Assessment/Recommendations   Assessment:    1.  Paroxysmal Atrial Fibrillation:  Anticoagulation:   Stroke risk: [id number] Score:6 (HTN, Age, Female, prior CVA)  HAS-BLED:3     Patient's stroke risk per year with no therapy: 12.5%, ASA only: 9.8%, Warfarin 4.1%, Eliquis: 3.3%.    Major bleeding risk with warfain: 5.6% per year, Eliquis is 3.9% per year.     Recommend:  -Continue warfarin therapy.    -Patient continues to want to seek alternative therapies for her anticoagulation.  I did discuss with her watchman therapy.  She will consider this given she does not want to be on long-term anticoagulation.  Feels fatigued with the use of warfarin.  She cannot afford long-term Eliquis therapy  -Increase amlodipine to 10 mg daily.  -Patient has not smoked for over a year       History of Present Illness    Ms. Jori Humphreys is a 82 y.o. female with atrial fibrillation and hypertension who presents in cardiology clinic in follow-up for A. fib and hypertension.        Last evaluation patient was hospitalized twice.  She was hospitalized in October for a fall which resulted in a significant laceration in her upper lip.    Recently she was hospitalized for hypertension with severe headache.  Her blood pressure today remains elevated despite recent increase in her lisinopril by her primary care provider which was reviewed.  As her headaches have improved with adjustment of her blood pressure agents.  He does not have any acute bleeding complications related to her warfarin therapy.      She denies any palpitations, syncope or chest pain symptoms.  She does not have any significant  dyspnea on exertion.  She has no lower extremity edema       Physical Examination Review of Systems   Vitals:    03/21/18 1257   BP: (!) 138/102   Pulse: 64   Resp: 20     Body mass index is 22.47 kg/(m^2).  Wt Readings from Last 3 Encounters:   03/21/18 135 lb (61.2 kg)   02/12/18 130 lb (59 kg)   10/17/17 128 lb 8 oz (58.3 kg)       General Appearance:   no distress, normal body habitus   ENT/Mouth: membranes moist, no oral lesions or bleeding gums.      EYES:  no scleral icterus, normal conjunctivae   Neck: no carotid bruits or thyromegaly   Chest/Lungs:   lungs are clear to auscultation, no rales or wheezing, no sternal scar, equal chest wall expansion    Cardiovascular:   Regular. Normal first and second heart sounds with no murmurs, rubs, or gallops; the carotid, radial and posterior tibial pulses are intact, Jugular venous pressure normal, no edema bilaterally (no change).    Abdomen:  no organomegaly, masses, bruits, or tenderness; bowel sounds are present   Extremities: no cyanosis or clubbing   Skin: no xanthelasma, warm.    Neurologic: normal gait, normal  bilateral, no tremors     Psychiatric: alert and oriented x3, calm     General: WNL  Eyes: WNL  Ears/Nose/Throat: WNL  Lungs: WNL  Heart: WNL  Stomach: WNL  Bladder: WNL  Muscle/Joints: WNL  Skin: WNL  Nervous System: WNL  Mental Health: WNL     Blood: WNL     Medical History  Surgical History Family History Social History   Past Medical History:   Diagnosis Date   ? Arthritis    ? Atrial fibrillation    ? Cancer 2008    Hx colon cancer   ? COPD (chronic obstructive pulmonary disease)    ? Depression    ? Eye pain    ? Hypertension    ? Nicotine dependence     Past Surgical History:   Procedure Laterality Date   ? CARPAL TUNNEL RELEASE  2011   ? CATARACT EXTRACTION Bilateral    ? Colon cancer removed  2008   ? KNEE SURGERY Left 1997   ? [place] TEMPORAL ARTERY LIGATN OR BX Bilateral 12/10/2014    Procedure: TEMPORAL ARTERY BIOPSY BILATERAL;  Surgeon:  Modesta Wilkerson MD;  Location: [place] [facility];  Service: ENT   ? ROTATOR CUFF REPAIR     ? TRIGGER FINGER RELEASE      History of high blood pressure in her sister Social History     Social History   ? Marital status: Single     Spouse name: N/A   ? Number of children: N/A   ? Years of education: N/A     Occupational History   ? Not on file.     Social History Main Topics   ? Smoking status: Former Smoker     Packs/day: 1.00     Types: Cigarettes     Quit date: 2/28/2017   ? Smokeless tobacco: Never Used   ? Alcohol use 1.5 oz/week     3 Standard drinks or equivalent per week   ? Drug use: No   ? Sexual activity: Not on file     Other Topics Concern   ? Not on file     Social History Narrative          Medications  Allergies   Current Outpatient Prescriptions   Medication Sig Dispense Refill   ? amLODIPine (NORVASC) 10 MG tablet Take 1 tablet (10 mg total) by mouth daily. 90 tablet 3   ? calcium carbonate-vitamin D2 500 mg(1,250mg) -200 unit tablet Take 1 tablet by mouth 2 (two) times a day.     ? guaiFENesin ER (MUCINEX) 600 mg 12 hr tablet Take 1 tablet (600 mg total) by mouth 2 (two) times a day as needed for congestion.  0   ? lisinopril (PRINIVIL,ZESTRIL) 20 MG tablet Take 1 tablet (20 mg total) by mouth daily. 30 tablet 1   ? polyvinyl alcohol (LIQUIFILM TEARS) 1.4 % ophthalmic solution Administer 1 drop to both eyes as needed for dry eyes.     ? risedronate (ACTONEL) 35 MG tablet Take 35 mg by mouth every 7 days. with water on empty stomach, nothing by mouth or lie down for next 30 minutes.   Takes on Mondays     ? sod chlor-bicarb-squeez bottle (NEILMED SINUS RINSE COMPLETE) pkdv 1 packet into each nostril 2 (two) times a day. 50 each 1   ? venlafaxine (EFFEXOR-XR) 150 MG 24 hr capsule Take 150 mg by mouth daily.     ? vitamins  A,C,E-zinc-copper (PRESERVISION AREDS) 14,320-226-200 unit-mg-unit cap Take 1 capsule by mouth 2 (two) times a day.     ? warfarin (COUMADIN) 5 MG tablet Take 6 mg by  mouth daily.       No current facility-administered medications for this visit.       No Known Allergies      Lab Results    Chemistry/lipid CBC Cardiac Enzymes/BNP/TSH/INR   Lab Results   Component Value Date    CHOL 211 (H) 02/23/2018    HDL 51 02/23/2018    LDLCALC 109 02/23/2018    TRIG 253 (H) 02/23/2018    CREATININE 0.81 02/23/2018    BUN 26 02/23/2018    K 3.6 02/23/2018     02/23/2018     02/23/2018    CO2 27 02/23/2018    Lab Results   Component Value Date    WBC 6.6 02/12/2018    HGB 13.4 02/12/2018    HCT 39.3 02/12/2018    MCV 92 02/12/2018     02/12/2018    Lab Results   Component Value Date    TROPONINI <0.01 02/12/2018    TSH 1.28 02/19/2015    INR 1.46 (H) 02/13/2018

## 2021-07-14 PROBLEM — I16.0 HYPERTENSIVE URGENCY: Status: RESOLVED | Noted: 2018-02-12 | Resolved: 2018-03-21

## 2021-07-22 ENCOUNTER — LAB REQUISITION (OUTPATIENT)
Dept: LAB | Facility: CLINIC | Age: 86
End: 2021-07-22
Payer: COMMERCIAL

## 2021-07-22 DIAGNOSIS — R21 RASH AND OTHER NONSPECIFIC SKIN ERUPTION: ICD-10-CM

## 2021-07-22 LAB
ALBUMIN SERPL-MCNC: 3.7 G/DL (ref 3.5–5)
ALP SERPL-CCNC: 75 U/L (ref 45–120)
ALT SERPL W P-5'-P-CCNC: 13 U/L (ref 0–45)
ANION GAP SERPL CALCULATED.3IONS-SCNC: 13 MMOL/L (ref 5–18)
AST SERPL W P-5'-P-CCNC: 24 U/L (ref 0–40)
BILIRUB SERPL-MCNC: 0.6 MG/DL (ref 0–1)
BUN SERPL-MCNC: 17 MG/DL (ref 8–28)
CALCIUM SERPL-MCNC: 9.8 MG/DL (ref 8.5–10.5)
CHLORIDE BLD-SCNC: 102 MMOL/L (ref 98–107)
CO2 SERPL-SCNC: 26 MMOL/L (ref 22–31)
CREAT SERPL-MCNC: 0.96 MG/DL (ref 0.6–1.1)
GFR SERPL CREATININE-BSD FRML MDRD: 55 ML/MIN/1.73M2
GLUCOSE BLD-MCNC: 103 MG/DL (ref 70–125)
POTASSIUM BLD-SCNC: 5.1 MMOL/L (ref 3.5–5)
PROT SERPL-MCNC: 7.3 G/DL (ref 6–8)
SODIUM SERPL-SCNC: 141 MMOL/L (ref 136–145)

## 2021-07-22 PROCEDURE — 80053 COMPREHEN METABOLIC PANEL: CPT | Performed by: FAMILY MEDICINE

## 2021-09-22 ENCOUNTER — LAB REQUISITION (OUTPATIENT)
Dept: LAB | Facility: CLINIC | Age: 86
End: 2021-09-22
Payer: COMMERCIAL

## 2021-09-22 DIAGNOSIS — L29.9 PRURITUS, UNSPECIFIED: ICD-10-CM

## 2021-09-22 LAB
ALBUMIN SERPL-MCNC: 3.8 G/DL (ref 3.5–5)
ALP SERPL-CCNC: 72 U/L (ref 45–120)
ALT SERPL W P-5'-P-CCNC: 11 U/L (ref 0–45)
ANION GAP SERPL CALCULATED.3IONS-SCNC: 13 MMOL/L (ref 5–18)
AST SERPL W P-5'-P-CCNC: 22 U/L (ref 0–40)
BASOPHILS # BLD AUTO: 0.1 10E3/UL (ref 0–0.2)
BASOPHILS NFR BLD AUTO: 1 %
BILIRUB SERPL-MCNC: 0.8 MG/DL (ref 0–1)
BUN SERPL-MCNC: 18 MG/DL (ref 8–28)
CALCIUM SERPL-MCNC: 9.5 MG/DL (ref 8.5–10.5)
CHLORIDE BLD-SCNC: 103 MMOL/L (ref 98–107)
CO2 SERPL-SCNC: 26 MMOL/L (ref 22–31)
CREAT SERPL-MCNC: 0.89 MG/DL (ref 0.6–1.1)
EOSINOPHIL # BLD AUTO: 0.2 10E3/UL (ref 0–0.7)
EOSINOPHIL NFR BLD AUTO: 2 %
ERYTHROCYTE [DISTWIDTH] IN BLOOD BY AUTOMATED COUNT: 19.2 % (ref 10–15)
GFR SERPL CREATININE-BSD FRML MDRD: 60 ML/MIN/1.73M2
GLUCOSE BLD-MCNC: 108 MG/DL (ref 70–125)
HCT VFR BLD AUTO: 40.3 % (ref 35–47)
HGB BLD-MCNC: 12.9 G/DL (ref 11.7–15.7)
IMM GRANULOCYTES # BLD: 0 10E3/UL
IMM GRANULOCYTES NFR BLD: 0 %
LYMPHOCYTES # BLD AUTO: 2 10E3/UL (ref 0.8–5.3)
LYMPHOCYTES NFR BLD AUTO: 28 %
MCH RBC QN AUTO: 28.8 PG (ref 26.5–33)
MCHC RBC AUTO-ENTMCNC: 32 G/DL (ref 31.5–36.5)
MCV RBC AUTO: 90 FL (ref 78–100)
MONOCYTES # BLD AUTO: 0.7 10E3/UL (ref 0–1.3)
MONOCYTES NFR BLD AUTO: 10 %
NEUTROPHILS # BLD AUTO: 4.2 10E3/UL (ref 1.6–8.3)
NEUTROPHILS NFR BLD AUTO: 59 %
NRBC # BLD AUTO: 0 10E3/UL
NRBC BLD AUTO-RTO: 0 /100
PLATELET # BLD AUTO: 155 10E3/UL (ref 150–450)
POTASSIUM BLD-SCNC: 4.8 MMOL/L (ref 3.5–5)
PROT SERPL-MCNC: 7.1 G/DL (ref 6–8)
RBC # BLD AUTO: 4.48 10E6/UL (ref 3.8–5.2)
SODIUM SERPL-SCNC: 142 MMOL/L (ref 136–145)
TSH SERPL DL<=0.005 MIU/L-ACNC: 2.32 UIU/ML (ref 0.3–5)
WBC # BLD AUTO: 7.1 10E3/UL (ref 4–11)

## 2021-09-22 PROCEDURE — 80053 COMPREHEN METABOLIC PANEL: CPT | Mod: ORL | Performed by: STUDENT IN AN ORGANIZED HEALTH CARE EDUCATION/TRAINING PROGRAM

## 2021-09-22 PROCEDURE — 85025 COMPLETE CBC W/AUTO DIFF WBC: CPT | Mod: ORL | Performed by: STUDENT IN AN ORGANIZED HEALTH CARE EDUCATION/TRAINING PROGRAM

## 2021-09-22 PROCEDURE — 84443 ASSAY THYROID STIM HORMONE: CPT | Mod: ORL | Performed by: STUDENT IN AN ORGANIZED HEALTH CARE EDUCATION/TRAINING PROGRAM

## 2021-10-05 ENCOUNTER — LAB REQUISITION (OUTPATIENT)
Dept: LAB | Facility: CLINIC | Age: 86
End: 2021-10-05
Payer: COMMERCIAL

## 2021-10-05 DIAGNOSIS — Z01.812 ENCOUNTER FOR PREPROCEDURAL LABORATORY EXAMINATION: ICD-10-CM

## 2021-10-05 LAB — SARS-COV-2 RNA RESP QL NAA+PROBE: NEGATIVE

## 2021-10-05 PROCEDURE — U0003 INFECTIOUS AGENT DETECTION BY NUCLEIC ACID (DNA OR RNA); SEVERE ACUTE RESPIRATORY SYNDROME CORONAVIRUS 2 (SARS-COV-2) (CORONAVIRUS DISEASE [COVID-19]), AMPLIFIED PROBE TECHNIQUE, MAKING USE OF HIGH THROUGHPUT TECHNOLOGIES AS DESCRIBED BY CMS-2020-01-R: HCPCS | Mod: ORL | Performed by: STUDENT IN AN ORGANIZED HEALTH CARE EDUCATION/TRAINING PROGRAM

## 2021-10-20 ENCOUNTER — LAB REQUISITION (OUTPATIENT)
Dept: LAB | Facility: CLINIC | Age: 86
End: 2021-10-20
Payer: COMMERCIAL

## 2021-10-20 DIAGNOSIS — I71.40 ABDOMINAL AORTIC ANEURYSM, WITHOUT RUPTURE: ICD-10-CM

## 2021-10-20 LAB
ANION GAP SERPL CALCULATED.3IONS-SCNC: 12 MMOL/L (ref 5–18)
BUN SERPL-MCNC: 20 MG/DL (ref 8–28)
CALCIUM SERPL-MCNC: 9.4 MG/DL (ref 8.5–10.5)
CHLORIDE BLD-SCNC: 105 MMOL/L (ref 98–107)
CO2 SERPL-SCNC: 23 MMOL/L (ref 22–31)
CREAT SERPL-MCNC: 0.78 MG/DL (ref 0.6–1.1)
GFR SERPL CREATININE-BSD FRML MDRD: 70 ML/MIN/1.73M2
GLUCOSE BLD-MCNC: 106 MG/DL (ref 70–125)
INR PPP: 4.98 (ref 0.85–1.15)
POTASSIUM BLD-SCNC: 5.4 MMOL/L (ref 3.5–5)
SODIUM SERPL-SCNC: 140 MMOL/L (ref 136–145)

## 2021-10-20 PROCEDURE — 80048 BASIC METABOLIC PNL TOTAL CA: CPT | Mod: ORL | Performed by: STUDENT IN AN ORGANIZED HEALTH CARE EDUCATION/TRAINING PROGRAM

## 2021-10-20 PROCEDURE — 85610 PROTHROMBIN TIME: CPT | Mod: ORL | Performed by: STUDENT IN AN ORGANIZED HEALTH CARE EDUCATION/TRAINING PROGRAM

## 2022-07-01 ENCOUNTER — LAB REQUISITION (OUTPATIENT)
Dept: LAB | Facility: CLINIC | Age: 87
End: 2022-07-01
Payer: COMMERCIAL

## 2022-07-01 DIAGNOSIS — I10 ESSENTIAL (PRIMARY) HYPERTENSION: ICD-10-CM

## 2022-07-01 DIAGNOSIS — E78.5 HYPERLIPIDEMIA, UNSPECIFIED: ICD-10-CM

## 2022-07-01 LAB
CHOLEST SERPL-MCNC: 173 MG/DL
HDLC SERPL-MCNC: 49 MG/DL
LDLC SERPL CALC-MCNC: 107 MG/DL
NONHDLC SERPL-MCNC: 124 MG/DL
TRIGL SERPL-MCNC: 84 MG/DL

## 2022-07-01 PROCEDURE — 84155 ASSAY OF PROTEIN SERUM: CPT | Mod: ORL | Performed by: STUDENT IN AN ORGANIZED HEALTH CARE EDUCATION/TRAINING PROGRAM

## 2022-07-01 PROCEDURE — 80061 LIPID PANEL: CPT | Mod: ORL | Performed by: STUDENT IN AN ORGANIZED HEALTH CARE EDUCATION/TRAINING PROGRAM

## 2022-07-02 LAB
ALBUMIN SERPL BCG-MCNC: 4.1 G/DL (ref 3.5–5.2)
ALP SERPL-CCNC: 75 U/L (ref 35–104)
ALT SERPL W P-5'-P-CCNC: 19 U/L (ref 10–35)
ANION GAP SERPL CALCULATED.3IONS-SCNC: 16 MMOL/L (ref 7–15)
AST SERPL W P-5'-P-CCNC: ABNORMAL U/L
BILIRUB SERPL-MCNC: 0.6 MG/DL
BUN SERPL-MCNC: 23.9 MG/DL (ref 8–23)
CALCIUM SERPL-MCNC: 9.2 MG/DL (ref 8.8–10.2)
CHLORIDE SERPL-SCNC: 105 MMOL/L (ref 98–107)
CREAT SERPL-MCNC: 0.78 MG/DL (ref 0.51–0.95)
DEPRECATED HCO3 PLAS-SCNC: 23 MMOL/L (ref 22–29)
GFR SERPL CREATININE-BSD FRML MDRD: 74 ML/MIN/1.73M2
GLUCOSE SERPL-MCNC: 111 MG/DL (ref 70–99)
POTASSIUM SERPL-SCNC: 3.8 MMOL/L (ref 3.4–5.3)
PROT SERPL-MCNC: 6.8 G/DL (ref 6.4–8.3)
SODIUM SERPL-SCNC: 144 MMOL/L (ref 136–145)

## 2022-09-01 ENCOUNTER — APPOINTMENT (OUTPATIENT)
Dept: CT IMAGING | Facility: CLINIC | Age: 87
End: 2022-09-01
Payer: COMMERCIAL

## 2022-09-01 ENCOUNTER — HOSPITAL ENCOUNTER (EMERGENCY)
Facility: CLINIC | Age: 87
Discharge: HOME | End: 2022-09-01
Attending: EMERGENCY MEDICINE | Admitting: EMERGENCY MEDICINE
Payer: COMMERCIAL

## 2022-09-01 VITALS
BODY MASS INDEX: 23.6 KG/M2 | HEART RATE: 72 BPM | TEMPERATURE: 97.9 F | DIASTOLIC BLOOD PRESSURE: 84 MMHG | SYSTOLIC BLOOD PRESSURE: 122 MMHG | RESPIRATION RATE: 16 BRPM | WEIGHT: 125 LBS | OXYGEN SATURATION: 95 % | HEIGHT: 61 IN

## 2022-09-01 DIAGNOSIS — S39.012A BACK STRAIN, INITIAL ENCOUNTER: ICD-10-CM

## 2022-09-01 PROCEDURE — 250N000013 HC RX MED GY IP 250 OP 250 PS 637: Performed by: EMERGENCY MEDICINE

## 2022-09-01 PROCEDURE — 72131 CT LUMBAR SPINE W/O DYE: CPT

## 2022-09-01 PROCEDURE — 250N000013 HC RX MED GY IP 250 OP 250 PS 637: Performed by: PHYSICIAN ASSISTANT

## 2022-09-01 PROCEDURE — 99284 EMERGENCY DEPT VISIT MOD MDM: CPT | Mod: 25

## 2022-09-01 RX ORDER — LIDOCAINE 4 G/G
1 PATCH TOPICAL ONCE
Status: DISCONTINUED | OUTPATIENT
Start: 2022-09-01 | End: 2022-09-02 | Stop reason: HOSPADM

## 2022-09-01 RX ORDER — ACETAMINOPHEN 325 MG/1
975 TABLET ORAL ONCE
Status: COMPLETED | OUTPATIENT
Start: 2022-09-01 | End: 2022-09-01

## 2022-09-01 RX ORDER — IBUPROFEN 400 MG/1
400 TABLET, FILM COATED ORAL ONCE
Status: COMPLETED | OUTPATIENT
Start: 2022-09-01 | End: 2022-09-01

## 2022-09-01 RX ADMIN — IBUPROFEN 400 MG: 400 TABLET ORAL at 23:43

## 2022-09-01 RX ADMIN — LIDOCAINE 1 PATCH: 246 PATCH TOPICAL at 22:00

## 2022-09-01 RX ADMIN — ACETAMINOPHEN 975 MG: 325 TABLET, COATED ORAL at 21:48

## 2022-09-01 ASSESSMENT — ENCOUNTER SYMPTOMS
ROS GI COMMENTS: NEGATIVE FOR INCONTINENCE
BACK PAIN: 1
CONSTIPATION: 1
NUMBNESS: 1

## 2022-09-01 ASSESSMENT — ACTIVITIES OF DAILY LIVING (ADL): ADLS_ACUITY_SCORE: 35

## 2022-09-01 NOTE — ED TRIAGE NOTES
"The patient presents to the ED with c/o worsening lower back pain that radiates into her buttocks and down her right leg. Patient states she is in the process of moving so has been \"pushing and pulling\" things. Was started on Oxycodone and Prednisone by [facility].     Triage Assessment     Row Name 09/01/22 0536       Triage Assessment (Adult)    Airway WDL WDL       Respiratory WDL    Respiratory WDL WDL       Skin Circulation/Temperature WDL    Skin Circulation/Temperature WDL WDL       Cardiac WDL    Cardiac WDL WDL       Peripheral/Neurovascular WDL    Peripheral Neurovascular WDL WDL       Cognitive/Neuro/Behavioral WDL    Cognitive/Neuro/Behavioral WDL WDL              "

## 2022-09-02 NOTE — ED PROVIDER NOTES
EMERGENCY DEPARTMENT ENCOUNTER      NAME: Jori Humphreys  AGE: 86 year old female  YOB: 1935  MRN: [id number]  EVALUATION DATE & TIME: 9/1/2022 10:55 PM    PCP: Tawana Archer    ED PROVIDER: Edu Love M.D.      Chief Complaint   Patient presents with     Back Pain         FINAL IMPRESSION:  1. Back strain, initial encounter          ED COURSE & MEDICAL DECISION MAKING:    Pertinent Labs & Imaging studies reviewed. (See chart for details)  86 year old female presents to the Emergency Department for evaluation of back pain.  On exam it seems likely strain.  Did consider cauda equina or other fracture.  CT scan is negative for these.  No signs of infection or abscess.  No signs of bleeding.  I do not think this is AAA or other more serious cause.  Patient feeling better after letting patch, Tylenol and ibuprofen.  Able to ambulate in the ER.  No signs of urine retention or incontinence.  Will discharge home.  Follow-up with primary.  Return for worsening symptoms.    11:15 PM I met with the patient to gather history and to perform my initial exam. I discussed the plan for care while in the Emergency Department. PPE: Facemask, goggles and gloves     At the conclusion of the encounter I discussed the results of all of the tests and the disposition. The questions were answered. The patient or family acknowledged understanding and was agreeable with the care plan.       MEDICATIONS GIVEN IN THE EMERGENCY:  Medications   acetaminophen (TYLENOL) tablet 975 mg (975 mg Oral Given 9/1/22 0028)   ibuprofen (ADVIL/MOTRIN) tablet 400 mg (400 mg Oral Given 9/1/22 5973)       NEW PRESCRIPTIONS STARTED AT TODAY'S ER VISIT  Discharge Medication List as of 9/1/2022 11:43 PM             =================================================================    HPI    Patient information was obtained from: Patient    Use of : N/A         Jori Humphreys is a 86 year old female with a pertinent history  of AAA who presents to this ED by walk in for evaluation of back pain.    Patient reports that she developed localized lower back pain five days ago and was seen at urgent care and discharged with oxycodone and prednisone. Notes she has been in the process of moving to [place]. Today patient could hardly walk due to her back pain and had numbness in her bilateral toes. Patient additionally reports she has not had a bowel movement today which is not normal for her. She has not taken tylenol at home. Patient has a walker and cane at home but normally does not use these. She has no medication allergies. Patient is on water pills. She denies any bowel or bladder incontinence, or any other complaints at this time.       REVIEW OF SYSTEMS   Review of Systems   Gastrointestinal: Positive for constipation.        Negative for incontinence   Musculoskeletal: Positive for back pain.   Neurological: Positive for numbness.   All other systems reviewed and are negative.       PAST MEDICAL HISTORY:  History reviewed. No pertinent past medical history.    PAST SURGICAL HISTORY:  Past Surgical History:   Procedure Laterality Date     ARTHROSCOPY SHOULDER ROTATOR CUFF REPAIR       CATARACT EXTRACTION Bilateral      HC REVISE MEDIAN N/CARPAL TUNNEL SURG Left 11/8/2019    Procedure: LEFT OPEN CARPAL TUNNEL RELEASE;  Surgeon: Alexis Fletcher MD;  Location: [facility];  Service: Orthopedics     KNEE SURGERY Left 1997     OTHER SURGICAL HISTORY  2008    Colon cancer removed     [place] INCISE FINGER TENDON SHEATH Left 8/24/2018    Procedure: LEFT MIDDLE TRIGGER FINGER RELEASE AND GANGLION CYST EXCISION;  Surgeon: Alexis Fletcher MD;  Location: [facility] OR;  Service: Orthopedics     [place] SHLDR ARTHROSCOP,SURG,W/ROTAT CUFF REPR Left 8/24/2018    Procedure: LEFT SHOULDER ARTHROSCOPIC ROTATOR CUFF REPAIR, ARTHROSCOPIC DEBRIDEMENT AND SUBACHROMIAL DECOMPRESSION;  Surgeon: Alexis Fletcher MD;  Location: [facility] OR;  Service: Orthopedics  "    [place] TEMPORAL ARTERY LIGATN OR BX Bilateral 12/10/2014    Procedure: TEMPORAL ARTERY BIOPSY BILATERAL;  Surgeon: Modesta Wilkerson MD;  Location: [place] [facility];  Service: ENT     RELEASE CARPAL TUNNEL  2011     RELEASE TRIGGER FINGER             CURRENT MEDICATIONS:    No current facility-administered medications for this encounter.     Current Outpatient Medications   Medication     alendronate (FOSAMAX) 70 MG tablet     budesonide-formoterol (SYMBICORT) 80-4.5 MCG/ACT Inhaler     Calcium Carbonate-Vitamin D (CALCIUM + D PO)     lisinopril (ZESTRIL) 20 MG tablet     Multiple Vitamins-Minerals (PRESERVISION AREDS PO)     Polyvinyl Alcohol-Povidone (REFRESH OP)     pramipexole (MIRAPEX) 0.125 MG tablet     sertraline (ZOLOFT) 25 MG tablet     warfarin ANTICOAGULANT (COUMADIN) 4 MG tablet     warfarin ANTICOAGULANT (COUMADIN) 5 MG tablet         ALLERGIES:  Allergies   Allergen Reactions     Alendronic Acid      Other reaction(s): , stomach bothered so quit early      Codeine Nausea     Risedronate      Other reaction(s): 8/3/09 can't tolerate,  stomach upset       FAMILY HISTORY:  History reviewed. No pertinent family history.    SOCIAL HISTORY:   Social History     Socioeconomic History     Marital status: Single   Tobacco Use     Smoking status: Former Smoker     Packs/day: 0.50     Years: 60.00     Pack years: 30.00     Types: Cigarettes     Quit date: 2017     Years since quittin.5     Smokeless tobacco: Never Used   Substance and Sexual Activity     Alcohol use: Yes     Alcohol/week: 4.2 standard drinks     Types: 5 Standard drinks or equivalent per week       VITALS:  /84   Pulse 72   Temp 97.9  F (36.6  C) (Temporal)   Resp 16   Ht 1.537 m (5' 0.5\")   Wt 56.7 kg (125 lb)   SpO2 95%   BMI 24.01 kg/m      PHYSICAL EXAM    Physical Exam  Constitutional:       General: She is not in acute distress.     Appearance: She is not diaphoretic.   HENT:      Head: Atraumatic.      " Mouth/Throat:      Pharynx: No oropharyngeal exudate.   Eyes:      General: No scleral icterus.     Pupils: Pupils are equal, round, and reactive to light.   Cardiovascular:      Heart sounds: Normal heart sounds.   Pulmonary:      Effort: No respiratory distress.      Breath sounds: Normal breath sounds.   Abdominal:      Palpations: Abdomen is soft.      Tenderness: There is no abdominal tenderness. There is no guarding or rebound.   Musculoskeletal:         General: No tenderness.   Skin:     General: Skin is warm.      Findings: No rash.   Neurological:      General: No focal deficit present.      Mental Status: She is alert.      Comments: 5 out of 5 strength in bilateral upper and lower extremities.  Sensation intact in all 4 extremes.   Toes down going.  DTR symmetric.  Straight leg raise negative bilaterally.              LAB:  All pertinent labs reviewed and interpreted.  Labs Ordered and Resulted from Time of ED Arrival to Time of ED Departure - No data to display    RADIOLOGY:  Reviewed all pertinent imaging. Please see official radiology report.  Lumbar spine CT w/o contrast   Final Result   IMPRESSION:   1.  No acute lumbar spine fracture.   2.  Multilevel spondylosis without high-grade canal stenosis. Moderate bilateral L5-S1 foraminal stenosis.            I, Edenilson Herrera, am serving as a scribe to document services personally performed by Dr. Edu Love, based on my observation and the provider's statements to me. I, Edu Love MD attest that Edenilson Herrera is acting in a scribe capacity, has observed my performance of the services and has documented them in accordance with my direction.    dEu Love M.D.  Emergency Medicine  [facility] EMERGENCY ROOM  [address] [id number] [phone]  Dept: [phone]     Edu Love MD  09/02/22 0457

## 2022-09-03 ENCOUNTER — HOSPITAL ENCOUNTER (INPATIENT)
Facility: CLINIC | Age: 87
LOS: 5 days | Discharge: NURSING, REHAB OR LONG-TERM CARE | DRG: 543 | End: 2022-09-08
Attending: EMERGENCY MEDICINE | Admitting: FAMILY MEDICINE
Payer: COMMERCIAL

## 2022-09-03 ENCOUNTER — NURSE TRIAGE (OUTPATIENT)
Dept: NURSING | Facility: CLINIC | Age: 87
End: 2022-09-03

## 2022-09-03 ENCOUNTER — APPOINTMENT (OUTPATIENT)
Dept: CT IMAGING | Facility: CLINIC | Age: 87
DRG: 543 | End: 2022-09-03
Attending: EMERGENCY MEDICINE
Payer: COMMERCIAL

## 2022-09-03 DIAGNOSIS — I48.91 ATRIAL FIBRILLATION, UNSPECIFIED TYPE (H): Primary | ICD-10-CM

## 2022-09-03 DIAGNOSIS — S32.10XA CLOSED FRACTURE OF SACRUM, UNSPECIFIED PORTION OF SACRUM, INITIAL ENCOUNTER (H): ICD-10-CM

## 2022-09-03 PROBLEM — F33.42 RECURRENT MAJOR DEPRESSION IN FULL REMISSION (H): Status: ACTIVE | Noted: 2021-06-15

## 2022-09-03 PROBLEM — J32.9 CHRONIC SINUSITIS, UNSPECIFIED LOCATION: Status: ACTIVE | Noted: 2022-09-03

## 2022-09-03 PROBLEM — I71.40 ABDOMINAL AORTIC ANEURYSM (AAA) WITHOUT RUPTURE (H): Status: ACTIVE | Noted: 2021-06-15

## 2022-09-03 PROBLEM — M81.0 OSTEOPOROSIS: Status: ACTIVE | Noted: 2021-06-15

## 2022-09-03 PROBLEM — J44.9 CHRONIC OBSTRUCTIVE PULMONARY DISEASE (H): Status: ACTIVE | Noted: 2021-06-15

## 2022-09-03 PROBLEM — Z79.01 LONG TERM (CURRENT) USE OF ANTICOAGULANTS: Status: ACTIVE | Noted: 2021-06-15

## 2022-09-03 PROBLEM — I50.22 CHRONIC SYSTOLIC CHF (CONGESTIVE HEART FAILURE) (H): Status: ACTIVE | Noted: 2021-10-08

## 2022-09-03 PROBLEM — S92.901A FOOT FRACTURE, RIGHT: Status: ACTIVE | Noted: 2017-10-15

## 2022-09-03 PROBLEM — I95.2 HYPOTENSION DUE TO DRUGS: Status: ACTIVE | Noted: 2017-10-15

## 2022-09-03 PROBLEM — I10 ACCELERATED HYPERTENSION: Status: ACTIVE | Noted: 2022-09-03

## 2022-09-03 PROBLEM — F33.9 EPISODE OF RECURRENT MAJOR DEPRESSIVE DISORDER (H): Status: ACTIVE | Noted: 2021-06-15

## 2022-09-03 PROBLEM — D69.6 THROMBOCYTOPENIA (H): Status: ACTIVE | Noted: 2021-10-09

## 2022-09-03 PROBLEM — I10 ESSENTIAL HYPERTENSION: Status: ACTIVE | Noted: 2021-06-15

## 2022-09-03 PROBLEM — D62 ACUTE BLOOD LOSS ANEMIA: Status: ACTIVE | Noted: 2021-10-09

## 2022-09-03 PROBLEM — E86.0 DEHYDRATION: Status: ACTIVE | Noted: 2017-10-15

## 2022-09-03 PROBLEM — K42.9 UMBILICAL HERNIA WITHOUT OBSTRUCTION AND WITHOUT GANGRENE: Status: ACTIVE | Noted: 2021-06-15

## 2022-09-03 PROBLEM — K66.1 INTRAPERITONEAL HEMATOMA: Status: ACTIVE | Noted: 2021-10-09
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
## 2022-09-03 PROBLEM — R79.89 ABNORMAL TSH: Status: ACTIVE | Noted: 2021-06-15

## 2022-09-03 LAB
ANION GAP SERPL CALCULATED.3IONS-SCNC: 13 MMOL/L (ref 5–18)
BUN SERPL-MCNC: 32 MG/DL (ref 8–28)
CALCIUM SERPL-MCNC: 9.6 MG/DL (ref 8.5–10.5)
CHLORIDE BLD-SCNC: 105 MMOL/L (ref 98–107)
CO2 SERPL-SCNC: 23 MMOL/L (ref 22–31)
CREAT SERPL-MCNC: 0.92 MG/DL (ref 0.6–1.1)
ERYTHROCYTE [DISTWIDTH] IN BLOOD BY AUTOMATED COUNT: 14.6 % (ref 10–15)
GFR SERPL CREATININE-BSD FRML MDRD: 61 ML/MIN/1.73M2
GLUCOSE BLD-MCNC: 149 MG/DL (ref 70–125)
HCT VFR BLD AUTO: 40 % (ref 35–47)
HGB BLD-MCNC: 13.4 G/DL (ref 11.7–15.7)
INR PPP: 4.38 (ref 0.85–1.15)
LIPASE SERPL-CCNC: 48 U/L (ref 0–52)
MCH RBC QN AUTO: 33.3 PG (ref 26.5–33)
MCHC RBC AUTO-ENTMCNC: 33.5 G/DL (ref 31.5–36.5)
MCV RBC AUTO: 99 FL (ref 78–100)
PLATELET # BLD AUTO: 145 10E3/UL (ref 150–450)
POTASSIUM BLD-SCNC: 3.5 MMOL/L (ref 3.5–5)
RBC # BLD AUTO: 4.03 10E6/UL (ref 3.8–5.2)
SARS-COV-2 RNA RESP QL NAA+PROBE: NEGATIVE
SODIUM SERPL-SCNC: 141 MMOL/L (ref 136–145)
WBC # BLD AUTO: 6.9 10E3/UL (ref 4–11)

## 2022-09-03 PROCEDURE — 85027 COMPLETE CBC AUTOMATED: CPT | Performed by: EMERGENCY MEDICINE

## 2022-09-03 PROCEDURE — 80048 BASIC METABOLIC PNL TOTAL CA: CPT | Performed by: EMERGENCY MEDICINE

## 2022-09-03 PROCEDURE — 99223 1ST HOSP IP/OBS HIGH 75: CPT | Performed by: FAMILY MEDICINE

## 2022-09-03 PROCEDURE — 84132 ASSAY OF SERUM POTASSIUM: CPT | Performed by: FAMILY MEDICINE

## 2022-09-03 PROCEDURE — 83690 ASSAY OF LIPASE: CPT | Performed by: EMERGENCY MEDICINE

## 2022-09-03 PROCEDURE — 96361 HYDRATE IV INFUSION ADD-ON: CPT

## 2022-09-03 PROCEDURE — 120N000001 HC R&B MED SURG/OB

## 2022-09-03 PROCEDURE — 70450 CT HEAD/BRAIN W/O DYE: CPT

## 2022-09-03 PROCEDURE — 99285 EMERGENCY DEPT VISIT HI MDM: CPT | Mod: 25

## 2022-09-03 PROCEDURE — 36415 COLL VENOUS BLD VENIPUNCTURE: CPT | Performed by: FAMILY MEDICINE

## 2022-09-03 PROCEDURE — C9803 HOPD COVID-19 SPEC COLLECT: HCPCS

## 2022-09-03 PROCEDURE — 96374 THER/PROPH/DIAG INJ IV PUSH: CPT | Mod: 59

## 2022-09-03 PROCEDURE — 36415 COLL VENOUS BLD VENIPUNCTURE: CPT | Performed by: EMERGENCY MEDICINE

## 2022-09-03 PROCEDURE — U0003 INFECTIOUS AGENT DETECTION BY NUCLEIC ACID (DNA OR RNA); SEVERE ACUTE RESPIRATORY SYNDROME CORONAVIRUS 2 (SARS-COV-2) (CORONAVIRUS DISEASE [COVID-19]), AMPLIFIED PROBE TECHNIQUE, MAKING USE OF HIGH THROUGHPUT TECHNOLOGIES AS DESCRIBED BY CMS-2020-01-R: HCPCS | Performed by: EMERGENCY MEDICINE

## 2022-09-03 PROCEDURE — 250N000011 HC RX IP 250 OP 636: Performed by: FAMILY MEDICINE

## 2022-09-03 PROCEDURE — 72193 CT PELVIS W/DYE: CPT

## 2022-09-03 PROCEDURE — 250N000013 HC RX MED GY IP 250 OP 250 PS 637: Performed by: EMERGENCY MEDICINE

## 2022-09-03 PROCEDURE — 250N000013 HC RX MED GY IP 250 OP 250 PS 637: Performed by: FAMILY MEDICINE

## 2022-09-03 PROCEDURE — 74174 CTA ABD&PLVS W/CONTRAST: CPT

## 2022-09-03 PROCEDURE — 85610 PROTHROMBIN TIME: CPT | Performed by: EMERGENCY MEDICINE

## 2022-09-03 PROCEDURE — 83735 ASSAY OF MAGNESIUM: CPT | Performed by: FAMILY MEDICINE

## 2022-09-03 PROCEDURE — 250N000011 HC RX IP 250 OP 636: Performed by: EMERGENCY MEDICINE

## 2022-09-03 PROCEDURE — 258N000003 HC RX IP 258 OP 636: Performed by: FAMILY MEDICINE

## 2022-09-03 RX ORDER — ACETAMINOPHEN 325 MG/1
975 TABLET ORAL EVERY 6 HOURS PRN
Status: DISCONTINUED | OUTPATIENT
Start: 2022-09-03 | End: 2022-09-08 | Stop reason: HOSPADM

## 2022-09-03 RX ORDER — LISINOPRIL 20 MG/1
20 TABLET ORAL EVERY MORNING
Status: DISCONTINUED | OUTPATIENT
Start: 2022-09-04 | End: 2022-09-08 | Stop reason: HOSPADM

## 2022-09-03 RX ORDER — OXYCODONE HYDROCHLORIDE 5 MG/1
5 TABLET ORAL EVERY 6 HOURS PRN
Status: ON HOLD | COMMUNITY
End: 2022-09-08

## 2022-09-03 RX ORDER — ASPIRIN 81 MG/1
81 TABLET ORAL DAILY
COMMUNITY

## 2022-09-03 RX ORDER — FUROSEMIDE 20 MG
40 TABLET ORAL DAILY
COMMUNITY

## 2022-09-03 RX ORDER — IBUPROFEN 200 MG
400 TABLET ORAL EVERY 6 HOURS PRN
COMMUNITY

## 2022-09-03 RX ORDER — LIDOCAINE 50 MG/G
1 PATCH TOPICAL EVERY 24 HOURS
COMMUNITY

## 2022-09-03 RX ORDER — ASPIRIN 81 MG/1
81 TABLET ORAL DAILY
Status: DISCONTINUED | OUTPATIENT
Start: 2022-09-04 | End: 2022-09-08 | Stop reason: HOSPADM

## 2022-09-03 RX ORDER — METOPROLOL SUCCINATE 100 MG/1
100 TABLET, EXTENDED RELEASE ORAL EVERY MORNING
COMMUNITY

## 2022-09-03 RX ORDER — ONDANSETRON 4 MG/1
4 TABLET, ORALLY DISINTEGRATING ORAL EVERY 6 HOURS PRN
Status: DISCONTINUED | OUTPATIENT
Start: 2022-09-03 | End: 2022-09-08 | Stop reason: HOSPADM

## 2022-09-03 RX ORDER — AMOXICILLIN 250 MG
2 CAPSULE ORAL 2 TIMES DAILY
Status: DISCONTINUED | OUTPATIENT
Start: 2022-09-03 | End: 2022-09-08 | Stop reason: HOSPADM

## 2022-09-03 RX ORDER — PROCHLORPERAZINE 25 MG
12.5 SUPPOSITORY, RECTAL RECTAL EVERY 12 HOURS PRN
Status: DISCONTINUED | OUTPATIENT
Start: 2022-09-03 | End: 2022-09-08 | Stop reason: HOSPADM

## 2022-09-03 RX ORDER — HYDROMORPHONE HYDROCHLORIDE 1 MG/ML
0.5 INJECTION, SOLUTION INTRAMUSCULAR; INTRAVENOUS; SUBCUTANEOUS
Status: DISCONTINUED | OUTPATIENT
Start: 2022-09-03 | End: 2022-09-08 | Stop reason: HOSPADM

## 2022-09-03 RX ORDER — FUROSEMIDE 40 MG
40 TABLET ORAL DAILY
Status: DISCONTINUED | OUTPATIENT
Start: 2022-09-04 | End: 2022-09-08 | Stop reason: HOSPADM

## 2022-09-03 RX ORDER — SERTRALINE HYDROCHLORIDE 25 MG/1
25 TABLET, FILM COATED ORAL DAILY
Status: DISCONTINUED | OUTPATIENT
Start: 2022-09-04 | End: 2022-09-08 | Stop reason: HOSPADM

## 2022-09-03 RX ORDER — ONDANSETRON 2 MG/ML
4 INJECTION INTRAMUSCULAR; INTRAVENOUS EVERY 6 HOURS PRN
Status: DISCONTINUED | OUTPATIENT
Start: 2022-09-03 | End: 2022-09-08 | Stop reason: HOSPADM

## 2022-09-03 RX ORDER — ACETAMINOPHEN 325 MG/1
650 TABLET ORAL ONCE
Status: COMPLETED | OUTPATIENT
Start: 2022-09-03 | End: 2022-09-03

## 2022-09-03 RX ORDER — LIDOCAINE 4 G/G
1 PATCH TOPICAL EVERY 24 HOURS
Status: DISCONTINUED | OUTPATIENT
Start: 2022-09-03 | End: 2022-09-08 | Stop reason: HOSPADM

## 2022-09-03 RX ORDER — IOPAMIDOL 755 MG/ML
100 INJECTION, SOLUTION INTRAVASCULAR ONCE
Status: COMPLETED | OUTPATIENT
Start: 2022-09-03 | End: 2022-09-03

## 2022-09-03 RX ORDER — OXYCODONE HCL 5 MG/5 ML
2.5 SOLUTION, ORAL ORAL ONCE
Status: COMPLETED | OUTPATIENT
Start: 2022-09-03 | End: 2022-09-03

## 2022-09-03 RX ORDER — ACETAMINOPHEN 325 MG/1
650 TABLET ORAL EVERY 6 HOURS PRN
COMMUNITY

## 2022-09-03 RX ORDER — PROCHLORPERAZINE MALEATE 5 MG
5 TABLET ORAL EVERY 6 HOURS PRN
Status: DISCONTINUED | OUTPATIENT
Start: 2022-09-03 | End: 2022-09-08 | Stop reason: HOSPADM

## 2022-09-03 RX ORDER — METOPROLOL SUCCINATE 100 MG/1
100 TABLET, EXTENDED RELEASE ORAL EVERY MORNING
Status: DISCONTINUED | OUTPATIENT
Start: 2022-09-04 | End: 2022-09-08 | Stop reason: HOSPADM

## 2022-09-03 RX ORDER — LIDOCAINE 40 MG/G
CREAM TOPICAL
Status: DISCONTINUED | OUTPATIENT
Start: 2022-09-03 | End: 2022-09-08 | Stop reason: HOSPADM

## 2022-09-03 RX ORDER — PANTOPRAZOLE SODIUM 20 MG/1
40 TABLET, DELAYED RELEASE ORAL
Status: DISCONTINUED | OUTPATIENT
Start: 2022-09-04 | End: 2022-09-08 | Stop reason: HOSPADM

## 2022-09-03 RX ORDER — SODIUM CHLORIDE 9 MG/ML
INJECTION, SOLUTION INTRAVENOUS CONTINUOUS
Status: DISCONTINUED | OUTPATIENT
Start: 2022-09-03 | End: 2022-09-04

## 2022-09-03 RX ORDER — AMOXICILLIN 250 MG
1 CAPSULE ORAL 2 TIMES DAILY
Status: DISCONTINUED | OUTPATIENT
Start: 2022-09-03 | End: 2022-09-08 | Stop reason: HOSPADM

## 2022-09-03 RX ADMIN — LIDOCAINE 1 PATCH: 246 PATCH TOPICAL at 22:36

## 2022-09-03 RX ADMIN — HYDROMORPHONE HYDROCHLORIDE 0.5 MG: 1 INJECTION, SOLUTION INTRAMUSCULAR; INTRAVENOUS; SUBCUTANEOUS at 22:55

## 2022-09-03 RX ADMIN — SODIUM CHLORIDE: 9 INJECTION, SOLUTION INTRAVENOUS at 22:36

## 2022-09-03 RX ADMIN — ACETAMINOPHEN 650 MG: 325 TABLET, FILM COATED ORAL at 20:48

## 2022-09-03 RX ADMIN — IOPAMIDOL 100 ML: 755 INJECTION, SOLUTION INTRAVENOUS at 19:04

## 2022-09-03 RX ADMIN — OXYCODONE HYDROCHLORIDE 2.5 MG: 5 SOLUTION ORAL at 17:42

## 2022-09-03 ASSESSMENT — ACTIVITIES OF DAILY LIVING (ADL)
DEPENDENT_IADLS:: INDEPENDENT
ADLS_ACUITY_SCORE: 35
ADLS_ACUITY_SCORE: 35
ADLS_ACUITY_SCORE: 30
ADLS_ACUITY_SCORE: 35

## 2022-09-03 ASSESSMENT — ENCOUNTER SYMPTOMS
HEADACHES: 0
NECK PAIN: 0
FEVER: 0
BACK PAIN: 1
DIARRHEA: 1

## 2022-09-03 NOTE — ED PROVIDER NOTES
EMERGENCY DEPARTMENT ENCOUNTER      NAME: Jori Humphreys  AGE: 86 year old female  YOB: 1935  MRN: [id number]  EVALUATION DATE & TIME: No admission date for patient encounter.    PCP: Tawana Archer    ED PROVIDER: Melo Tomas MD        Chief Complaint   Patient presents with     Back Pain         FINAL IMPRESSION:  1. Closed fracture of sacrum, unspecified portion of sacrum, initial encounter (H)          ED COURSE & MEDICAL DECISION MAKING:    Pertinent Labs & Imaging studies reviewed. (See chart for details)  86 year old female presents to the Emergency Department for evaluation of worsening low back pain and buttock pain hurts to sit.  Denies fall.  Previously seen in urgent care and ER.    ED Course as of 09/03/22 2205   Sat Sep 03, 2022   1717 Patient presents with worsening low back/pelvic pain that radiates into her bilateral buttocks and posterior thighs.  Denies any known injury.  Recently had CT imaging of lumbar spine were negative for fractures.  On exam she does have tenderness when compressing the pelvic bone concerning for possible pelvic fracture.  Also considered a pelvic hematoma as well as a leaking AAA in addition to osteoarthritis and/or spinal stenosis   1718 Spinal infection unlikely.  Cauda equina unlikely.   1718 Patient is use ibuprofen and Tylenol and lidocaine patches with no improvement.  Pain is progressed to the point where she cannot walk.  Came in by EMS.  This is her third evaluation for her pain   1718 Plan for admission   1719 Given oral oxycodone.  INR supratherapeutic   2204 CT does show sacral fractures   2205 This is likely the cause of her pain       4:34 PM I met with the patient to gather history and to perform my initial exam. I discussed the plan for care while in the Emergency Department.   5:15 PM Spoke with Dr. Conner CORONA.   8:35 PM Checked in on and updated patient. Patient is adamant she hasn't fallen. States she doesn't have a headache or neck  pain.  8:49 PM Spoke with hospitalist, Dr. Gordon.   9:10 PM Spoke with radiology.  They are asking about the indication for the CT head since with patient receiving contrast there may be some artifact which would make ruling out subarachnoid hemorrhage unlikely.  Discussed with radiology I have low suspicion for any intracranial hemorrhage since patient is denying fall or headache or confusion.     CT head negative    Patient does have sacral fractures.  Cannot ambulate due to pain.  Lives independently.  Plan for admission for pain control and PT OT evaluation    At the conclusion of the encounter I discussed the results of all of the tests and the disposition. The questions were answered. The patient or family acknowledged understanding and was agreeable with the care plan.     I wore goggles and an N95 mask during my time with the patient.         MEDICATIONS GIVEN IN THE EMERGENCY:  Medications   acetaminophen (TYLENOL) tablet 975 mg (has no administration in time range)   aspirin EC tablet 81 mg (has no administration in time range)   fluticasone-vilanterol (BREO ELLIPTA) 100-25 MCG/INH inhaler 1 puff (has no administration in time range)   furosemide (LASIX) tablet 40 mg (has no administration in time range)   calcium carbonate-vitamin D (OS-NUVIA with D) per tablet 1 tablet (has no administration in time range)   Lidocaine (LIDOCARE) 4 % Patch 1 patch (has no administration in time range)   lisinopril (ZESTRIL) tablet 20 mg (has no administration in time range)   metoprolol succinate ER (TOPROL XL) 24 hr tablet 100 mg (has no administration in time range)   oxyCODONE IR (ROXICODONE) half-tab 2.5-5 mg (has no administration in time range)   sertraline (ZOLOFT) tablet 25 mg (has no administration in time range)   polyethylene glycol-propylene glycol PF (SYSTANE ULTRA PF) opthalmic solution 2 drop (has no administration in time range)   Warfarin Dose Required Daily - Pharmacist Managed (has no administration in  "time range)   lidocaine 1 % 0.1-1 mL (has no administration in time range)   lidocaine (LMX4) cream (has no administration in time range)   sodium chloride (PF) 0.9% PF flush 3 mL (has no administration in time range)   sodium chloride (PF) 0.9% PF flush 3 mL (has no administration in time range)   melatonin tablet 1 mg (has no administration in time range)   Patient is already receiving anticoagulation with heparin, enoxaparin (LOVENOX), warfarin (COUMADIN)  or other anticoagulant medication (has no administration in time range)   sodium chloride 0.9% infusion (has no administration in time range)   senna-docusate (SENOKOT-S/PERICOLACE) 8.6-50 MG per tablet 1 tablet (has no administration in time range)     Or   senna-docusate (SENOKOT-S/PERICOLACE) 8.6-50 MG per tablet 2 tablet (has no administration in time range)   ondansetron (ZOFRAN ODT) ODT tab 4 mg (has no administration in time range)     Or   ondansetron (ZOFRAN) injection 4 mg (has no administration in time range)   prochlorperazine (COMPAZINE) injection 5 mg (has no administration in time range)     Or   prochlorperazine (COMPAZINE) tablet 5 mg (has no administration in time range)     Or   prochlorperazine (COMPAZINE) suppository 12.5 mg (has no administration in time range)   HYDROmorphone (PF) (DILAUDID) injection 0.5 mg (has no administration in time range)   pantoprazole (PROTONIX) EC tablet 40 mg (has no administration in time range)   oxyCODONE (ROXICODONE) solution 2.5 mg (2.5 mg Oral Given 9/3/22 1742)   iopamidol (ISOVUE-370) solution 100 mL (100 mLs Intravenous Given 9/3/22 1904)   acetaminophen (TYLENOL) tablet 650 mg (650 mg Oral Given 9/3/22 2048)       NEW PRESCRIPTIONS STARTED AT TODAY'S ER VISIT  New Prescriptions    No medications on file          =================================================================    HPI    Triage note  \"  Patient has developed worsening low back and pelvic pain over the last week, has been seen for this " "2 times; imaging has been negative thus far; patient is having a hard time sitting in triage and is very anxious.      Triage Assessment     Row Name 09/03/22 5178       Triage Assessment (Adult)    Airway WDL WDL       Respiratory WDL    Respiratory WDL WDL       Skin Circulation/Temperature WDL    Skin Circulation/Temperature WDL WDL       Cardiac WDL    Cardiac WDL WDL       Peripheral/Neurovascular WDL    Peripheral Neurovascular WDL WDL       Cognitive/Neuro/Behavioral WDL    Cognitive/Neuro/Behavioral WDL WDL              \"      Patient information was obtained from: Patient    Use of : N/A         Jori Humphreys is a 86 year old female with a pertinent history of hypertension, anemia, atrial fibrillation, COPD, AAA, and CHF who presents to this ED by EMS for evaluation of back pain.    Per chart review: On 9/01/2022 at [facility], patient presented for back pain. CT scan was negative. No signs of infection, abscess, or bleeding. Patient feeling better after letting patch, Tylenol and ibuprofen. Able to ambulate in the ER. No signs of urine retention or incontinence. Will discharge home to follow-up with primary.    Patient notes this has been ongoing for a week, and was seen multiple times for it. She states the back pain has worsened to the point she can't sit up. When she lays down she isn't in any pain. Patient notes the there is pain behind her thighs, in her pelvic area, her buttocks, and her back. Patient states having diarrhea. Patient denies any recent falls. Patient notes for pain she has tried back patches, tylenol, and ibuprofen all without relief.     Patient endorses being on warfarin. Patient states her INR levels have been good. Patient endorses having colon cancer, a hernia, and a pacemaker. Patient denies history of back surgeries. Patient denies urinary problems, fever, headache, neck pain, muscle stiffness, or any other complaints at this time.     REVIEW OF SYSTEMS "   Review of Systems   Constitutional: Negative for fever.   Gastrointestinal: Positive for diarrhea.   Genitourinary: Positive for pelvic pain.        Negative for urinary problems   Musculoskeletal: Positive for back pain. Negative for neck pain.        Positive for buttocks pain, and thigh pain   Neurological: Negative for headaches.   All other systems reviewed and are negative.       PAST MEDICAL HISTORY:  History reviewed. No pertinent past medical history.    PAST SURGICAL HISTORY:  Past Surgical History:   Procedure Laterality Date     ARTHROSCOPY SHOULDER ROTATOR CUFF REPAIR       CATARACT EXTRACTION Bilateral      HC REVISE MEDIAN N/CARPAL TUNNEL SURG Left 11/8/2019    Procedure: LEFT OPEN CARPAL TUNNEL RELEASE;  Surgeon: Alexis Fletcher MD;  Location: [facility] OR;  Service: Orthopedics     KNEE SURGERY Left 1997     OTHER SURGICAL HISTORY  2008    Colon cancer removed     CO INCISE FINGER TENDON SHEATH Left 8/24/2018    Procedure: LEFT MIDDLE TRIGGER FINGER RELEASE AND GANGLION CYST EXCISION;  Surgeon: Alexis Fletcher MD;  Location: [facility] OR;  Service: Orthopedics     CO SHLDR ARTHROSCOP,SURG,W/ROTAT CUFF REPR Left 8/24/2018    Procedure: LEFT SHOULDER ARTHROSCOPIC ROTATOR CUFF REPAIR, ARTHROSCOPIC DEBRIDEMENT AND SUBACHROMIAL DECOMPRESSION;  Surgeon: Alexis Fletcher MD;  Location: [facility] OR;  Service: Orthopedics     CO TEMPORAL ARTERY LIGATN OR BX Bilateral 12/10/2014    Procedure: TEMPORAL ARTERY BIOPSY BILATERAL;  Surgeon: Modesta Wilkerson MD;  Location: [facility] OR;  Service: ENT     RELEASE CARPAL TUNNEL  2011     RELEASE TRIGGER FINGER             CURRENT MEDICATIONS:    acetaminophen (TYLENOL) 325 MG tablet  alendronate (FOSAMAX) 70 MG tablet  aspirin 81 MG EC tablet  budesonide-formoterol (SYMBICORT) 80-4.5 MCG/ACT Inhaler  calcium carbonate-vitamin D (OSCAL W/D) 500-200 MG-UNIT tablet  furosemide (LASIX) 20 MG tablet  ibuprofen (ADVIL/MOTRIN) 200 MG tablet  lidocaine  "(LIDODERM) 5 % patch  lisinopril (ZESTRIL) 20 MG tablet  metoprolol succinate ER (TOPROL XL) 100 MG 24 hr tablet  Multiple Vitamins-Minerals (PRESERVISION AREDS PO)  omeprazole (PRILOSEC) 20 MG DR capsule  oxyCODONE (ROXICODONE) 5 MG tablet  Polyvinyl Alcohol-Povidone (REFRESH OP)  sertraline (ZOLOFT) 25 MG tablet  warfarin ANTICOAGULANT (COUMADIN) 4 MG tablet        ALLERGIES:  Allergies   Allergen Reactions     Alendronic Acid      Other reaction(s): , stomach bothered so quit early      Codeine Nausea     Risedronate      Other reaction(s): 8/3/09 can't tolerate,  stomach upset       FAMILY HISTORY:  History reviewed. No pertinent family history.    SOCIAL HISTORY:   Social History     Socioeconomic History     Marital status: Single   Tobacco Use     Smoking status: Former Smoker     Packs/day: 0.50     Years: 60.00     Pack years: 30.00     Types: Cigarettes     Quit date: 2017     Years since quittin.5     Smokeless tobacco: Never Used   Substance and Sexual Activity     Alcohol use: Yes     Alcohol/week: 4.2 standard drinks     Types: 5 Standard drinks or equivalent per week       VITALS:  /80   Pulse 61   Temp 97.9  F (36.6  C) (Oral)   Resp 18   Ht 1.651 m (5' 5\")   Wt 56.2 kg (124 lb)   SpO2 95%   BMI 20.63 kg/m      PHYSICAL EXAM      Vitals: /80   Pulse 61   Temp 97.9  F (36.6  C) (Oral)   Resp 18   Ht 1.651 m (5' 5\")   Wt 56.2 kg (124 lb)   SpO2 95%   BMI 20.63 kg/m    General: Appears in no acute distress, awake, alert, interactive.  Eyes: Conjunctivae non-injected. Sclera anicteric.  HENT: Atraumatic.  Neck: Supple.  Respiratory/Chest: Respiration unlabored. 2+ dorsal pedal pulse.   Abdomen: Swelling, well healed surgical incision, no palpable masses.   Musculoskeletal: Normal extremities. No edema or erythema. No lumbar or thoracic tenderness. Patient screamed out in pain when squeezing her pelvis. Bilateral leg weakness, unclear if secondary to weakness or " pain.   Skin: Normal color. No rash or diaphoresis.  Neurologic: Face symmetric, moves all extremities spontaneously. Speech clear.  Psychiatric: Oriented to person, place, and time. Affect appropriate.       LAB:  All pertinent labs reviewed and interpreted.  Results for orders placed or performed during the hospital encounter of 09/03/22   CT Pelvis Bone w Contrast    Impression    IMPRESSION:  1.  Acute nondisplaced bilateral sacral alar fractures and the S2 segment.  2.  No pubic rami fractures are evident.  3.  Degenerative changes in both hips and SI joints.  4.  Small amount of free fluid in the pelvis.     CTA Abdomen Pelvis with Contrast    Impression    IMPRESSION:  1.  Stable appearance of aortobiiliac stent graft with decreased size of excluded aneurysm sac.  2.  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 Distal common femoral artery is patent.  3.  Subtle upper abdominal retroperitoneal fat stranding without definite source visualized. Recommend correlation with urinalysis and lipase.  4.  Possible focal pancreatic ductal dilation in the tail, not definitively seen on prior examination. Consider follow-up pancreas protocol CT on a nonemergent basis for further evaluation.  5.  Findings of congestive heart failure with significant dilation of IVC and hepatic veins.   Head CT w/o contrast    Impression    IMPRESSION:  1.  No acute intracranial process.   CBC (+ platelets, no diff)   Result Value Ref Range    WBC Count 6.9 4.0 - 11.0 10e3/uL    RBC Count 4.03 3.80 - 5.20 10e6/uL    Hemoglobin 13.4 11.7 - 15.7 g/dL    Hematocrit 40.0 35.0 - 47.0 %    MCV 99 78 - 100 fL    MCH 33.3 (H) 26.5 - 33.0 pg    MCHC 33.5 31.5 - 36.5 g/dL    RDW 14.6 10.0 - 15.0 %    Platelet Count 145 (L) 150 - 450 10e3/uL   Result Value Ref Range    INR 4.38 (H) 0.85 - 1.15   Basic metabolic panel   Result Value Ref Range    Sodium 141 136 - 145 mmol/L    Potassium 3.5 3.5 - 5.0 mmol/L    Chloride 105 98 - 107 mmol/L     Carbon Dioxide (CO2) 23 22 - 31 mmol/L    Anion Gap 13 5 - 18 mmol/L    Urea Nitrogen 32 (H) 8 - 28 mg/dL    Creatinine 0.92 0.60 - 1.10 mg/dL    Calcium 9.6 8.5 - 10.5 mg/dL    Glucose 149 (H) 70 - 125 mg/dL    GFR Estimate 61 >60 mL/min/1.73m2   Asymptomatic COVID-19 Virus (Coronavirus) by PCR Nasopharyngeal    Specimen: Nasopharyngeal; Swab   Result Value Ref Range    SARS CoV2 PCR Negative Negative   Result Value Ref Range    Lipase 48 0 - 52 U/L       RADIOLOGY:  Reviewed all pertinent imaging. Please see official radiology report.  Head CT w/o contrast   Final Result   IMPRESSION:   1.  No acute intracranial process.      CT Pelvis Bone w Contrast   Final Result   IMPRESSION:   1.  Acute nondisplaced bilateral sacral alar fractures and the S2 segment.   2.  No pubic rami fractures are evident.   3.  Degenerative changes in both hips and SI joints.   4.  Small amount of free fluid in the pelvis.         CTA Abdomen Pelvis with Contrast   Final Result   IMPRESSION:   1.  Stable appearance of aortobiiliac stent graft with decreased size of excluded aneurysm sac.   2.  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 Distal common femoral artery is patent.   3.  Subtle upper abdominal retroperitoneal fat stranding without definite source visualized. Recommend correlation with urinalysis and lipase.   4.  Possible focal pancreatic ductal dilation in the tail, not definitively seen on prior examination. Consider follow-up pancreas protocol CT on a nonemergent basis for further evaluation.   5.  Findings of congestive heart failure with significant dilation of IVC and hepatic veins.              I, Sandie Rodriguez, am serving as a scribe to document services personally performed by Pavan Tomas MD based on my observation and the provider's statements to me. I, Dr. Pavan Tomas, attest that Sandie Rodriguez is acting in a scribe capacity, has observed my performance of the services and has documented them in  accordance with my direction.    Pavan Tomas MD  Emergency Medicine  [facility] EMERGENCY ROOM  [facility] [facility] [address] [phone]     Pavan Tomas MD  09/03/22 1689

## 2022-09-03 NOTE — TELEPHONE ENCOUNTER
"C/o \"heart pain\" this morning for about 5 minutes, multiple times. No chest pain or SOB now. Cannot stand or walk any significant distance due to severe pain from recently diagnosed sciatica, back pain, arthritis. Not eating. Seen at ED 9/1 Ibuprofen, Tylenol and lidocaine patch not keeping comfortable. In \"excruciating pain\".  Caller afraid pt will fall. Lives alone and has no one to help. Advised call 911. Caller voiced understanding and agreement.       Reason for Disposition    [1] Chest pain lasts > 5 minutes AND [2] age > 44    Additional Information    Negative: SEVERE difficulty breathing (e.g., struggling for each breath, speaks in single words)    Negative: Difficult to awaken or acting confused (e.g., disoriented, slurred speech)    Negative: Shock suspected (e.g., cold/pale/clammy skin, too weak to stand, low BP, rapid pulse)    Negative: Passed out (i.e., lost consciousness, collapsed and was not responding)    Protocols used: CHEST PAIN-A-AH      "

## 2022-09-03 NOTE — ED TRIAGE NOTES
Patient has developed worsening low back and pelvic pain over the last week, has been seen for this 2 times; imaging has been negative thus far; patient is having a hard time sitting in triage and is very anxious.      Triage Assessment     Row Name 09/03/22 161       Triage Assessment (Adult)    Airway WDL WDL       Respiratory WDL    Respiratory WDL WDL       Skin Circulation/Temperature WDL    Skin Circulation/Temperature WDL WDL       Cardiac WDL    Cardiac WDL WDL       Peripheral/Neurovascular WDL    Peripheral Neurovascular WDL WDL       Cognitive/Neuro/Behavioral WDL    Cognitive/Neuro/Behavioral WDL WDL

## 2022-09-04 ENCOUNTER — APPOINTMENT (OUTPATIENT)
Dept: PHYSICAL THERAPY | Facility: CLINIC | Age: 87
DRG: 543 | End: 2022-09-04
Attending: FAMILY MEDICINE
Payer: COMMERCIAL

## 2022-09-04 LAB
ALBUMIN UR-MCNC: 50 MG/DL
ANION GAP SERPL CALCULATED.3IONS-SCNC: 13 MMOL/L (ref 5–18)
APPEARANCE UR: CLEAR
BACTERIA #/AREA URNS HPF: ABNORMAL /HPF
BILIRUB UR QL STRIP: NEGATIVE
BUN SERPL-MCNC: 29 MG/DL (ref 8–28)
CALCIUM SERPL-MCNC: 9.5 MG/DL (ref 8.5–10.5)
CHLORIDE BLD-SCNC: 108 MMOL/L (ref 98–107)
CO2 SERPL-SCNC: 21 MMOL/L (ref 22–31)
COLOR UR AUTO: ABNORMAL
CREAT SERPL-MCNC: 0.75 MG/DL (ref 0.6–1.1)
ERYTHROCYTE [DISTWIDTH] IN BLOOD BY AUTOMATED COUNT: 14.9 % (ref 10–15)
GFR SERPL CREATININE-BSD FRML MDRD: 78 ML/MIN/1.73M2
GLUCOSE BLD-MCNC: 131 MG/DL (ref 70–125)
GLUCOSE UR STRIP-MCNC: NEGATIVE MG/DL
HCT VFR BLD AUTO: 42.5 % (ref 35–47)
HGB BLD-MCNC: 14.1 G/DL (ref 11.7–15.7)
HGB UR QL STRIP: ABNORMAL
INR PPP: 4.39 (ref 0.85–1.15)
KETONES UR STRIP-MCNC: ABNORMAL MG/DL
LEUKOCYTE ESTERASE UR QL STRIP: ABNORMAL
MAGNESIUM SERPL-MCNC: 1.9 MG/DL (ref 1.8–2.6)
MAGNESIUM SERPL-MCNC: 1.9 MG/DL (ref 1.8–2.6)
MCH RBC QN AUTO: 32.8 PG (ref 26.5–33)
MCHC RBC AUTO-ENTMCNC: 33.2 G/DL (ref 31.5–36.5)
MCV RBC AUTO: 99 FL (ref 78–100)
NITRATE UR QL: NEGATIVE
PH UR STRIP: 5.5 [PH] (ref 5–7)
PLATELET # BLD AUTO: 159 10E3/UL (ref 150–450)
POTASSIUM BLD-SCNC: 3.9 MMOL/L (ref 3.5–5)
POTASSIUM BLD-SCNC: 4 MMOL/L (ref 3.5–5)
RBC # BLD AUTO: 4.3 10E6/UL (ref 3.8–5.2)
RBC URINE: 7 /HPF
SODIUM SERPL-SCNC: 142 MMOL/L (ref 136–145)
SP GR UR STRIP: >1.05 (ref 1–1.03)
UROBILINOGEN UR STRIP-MCNC: <2 MG/DL
WBC # BLD AUTO: 6.6 10E3/UL (ref 4–11)
WBC URINE: 3 /HPF

## 2022-09-04 PROCEDURE — 36415 COLL VENOUS BLD VENIPUNCTURE: CPT | Performed by: FAMILY MEDICINE

## 2022-09-04 PROCEDURE — 81001 URINALYSIS AUTO W/SCOPE: CPT | Performed by: EMERGENCY MEDICINE

## 2022-09-04 PROCEDURE — 83735 ASSAY OF MAGNESIUM: CPT | Performed by: FAMILY MEDICINE

## 2022-09-04 PROCEDURE — 85610 PROTHROMBIN TIME: CPT | Performed by: FAMILY MEDICINE

## 2022-09-04 PROCEDURE — 97162 PT EVAL MOD COMPLEX 30 MIN: CPT | Mod: GP

## 2022-09-04 PROCEDURE — 120N000001 HC R&B MED SURG/OB

## 2022-09-04 PROCEDURE — 51702 INSERT TEMP BLADDER CATH: CPT

## 2022-09-04 PROCEDURE — G0378 HOSPITAL OBSERVATION PER HR: HCPCS

## 2022-09-04 PROCEDURE — 97530 THERAPEUTIC ACTIVITIES: CPT | Mod: GP

## 2022-09-04 PROCEDURE — 82310 ASSAY OF CALCIUM: CPT | Performed by: FAMILY MEDICINE

## 2022-09-04 PROCEDURE — 250N000013 HC RX MED GY IP 250 OP 250 PS 637: Performed by: FAMILY MEDICINE

## 2022-09-04 PROCEDURE — 99232 SBSQ HOSP IP/OBS MODERATE 35: CPT | Performed by: FAMILY MEDICINE

## 2022-09-04 PROCEDURE — 96361 HYDRATE IV INFUSION ADD-ON: CPT

## 2022-09-04 PROCEDURE — 85027 COMPLETE CBC AUTOMATED: CPT | Performed by: FAMILY MEDICINE

## 2022-09-04 RX ORDER — NALOXONE HYDROCHLORIDE 0.4 MG/ML
0.4 INJECTION, SOLUTION INTRAMUSCULAR; INTRAVENOUS; SUBCUTANEOUS
Status: DISCONTINUED | OUTPATIENT
Start: 2022-09-04 | End: 2022-09-08 | Stop reason: HOSPADM

## 2022-09-04 RX ORDER — KETOROLAC TROMETHAMINE 15 MG/ML
15 INJECTION, SOLUTION INTRAMUSCULAR; INTRAVENOUS EVERY 6 HOURS PRN
Status: DISCONTINUED | OUTPATIENT
Start: 2022-09-04 | End: 2022-09-08 | Stop reason: HOSPADM

## 2022-09-04 RX ORDER — NALOXONE HYDROCHLORIDE 0.4 MG/ML
0.2 INJECTION, SOLUTION INTRAMUSCULAR; INTRAVENOUS; SUBCUTANEOUS
Status: DISCONTINUED | OUTPATIENT
Start: 2022-09-04 | End: 2022-09-08 | Stop reason: HOSPADM

## 2022-09-04 RX ADMIN — OXYCODONE HYDROCHLORIDE 5 MG: 5 TABLET ORAL at 16:33

## 2022-09-04 RX ADMIN — ASPIRIN 81 MG: 81 TABLET, COATED ORAL at 08:19

## 2022-09-04 RX ADMIN — Medication 1 TABLET: at 19:48

## 2022-09-04 RX ADMIN — SERTRALINE HYDROCHLORIDE 25 MG: 25 TABLET, FILM COATED ORAL at 12:56

## 2022-09-04 RX ADMIN — OXYCODONE HYDROCHLORIDE 5 MG: 5 TABLET ORAL at 19:48

## 2022-09-04 RX ADMIN — METOPROLOL SUCCINATE 100 MG: 100 TABLET, EXTENDED RELEASE ORAL at 12:56

## 2022-09-04 RX ADMIN — PANTOPRAZOLE SODIUM 40 MG: 20 TABLET, DELAYED RELEASE ORAL at 08:19

## 2022-09-04 RX ADMIN — Medication 1 TABLET: at 12:56

## 2022-09-04 RX ADMIN — FUROSEMIDE 40 MG: 40 TABLET ORAL at 08:20

## 2022-09-04 RX ADMIN — LIDOCAINE 1 PATCH: 246 PATCH TOPICAL at 21:05

## 2022-09-04 RX ADMIN — LISINOPRIL 20 MG: 10 TABLET ORAL at 08:20

## 2022-09-04 RX ADMIN — OXYCODONE HYDROCHLORIDE 5 MG: 5 TABLET ORAL at 01:13

## 2022-09-04 RX ADMIN — OXYCODONE HYDROCHLORIDE 5 MG: 5 TABLET ORAL at 05:28

## 2022-09-04 ASSESSMENT — ACTIVITIES OF DAILY LIVING (ADL)
ADLS_ACUITY_SCORE: 30
ADLS_ACUITY_SCORE: 32
ADLS_ACUITY_SCORE: 32
ADLS_ACUITY_SCORE: 38
ADLS_ACUITY_SCORE: 38
ADLS_ACUITY_SCORE: 32
ADLS_ACUITY_SCORE: 31
ADLS_ACUITY_SCORE: 31
ADLS_ACUITY_SCORE: 32
ADLS_ACUITY_SCORE: 30
ADLS_ACUITY_SCORE: 32
ADLS_ACUITY_SCORE: 31

## 2022-09-04 NOTE — PLAN OF CARE
Problem: Pain Acute  Goal: Acceptable Pain Control and Functional Ability  Outcome: Ongoing, Progressing   Goal Outcome Evaluation:        VSS on room air.  Moderate to high pain controlled with oral pain medication and positioning.  Assist of 2 to bathroom.

## 2022-09-04 NOTE — H&P
[facility] MEDICINE ADMISSION HISTORY AND PHYSICAL     Assessment & Plan       Jori Humphreys is a 86 year old old female with history of COPD, atrial fibrillation on chronic anticoagulation, essential hypertension, and abdominal aortic aneurysm status postrepair presented to the ER complaining of pelvic and low back pain.  She been seen multiple times and ultimately imaging revealed sacral alar fractures.      Bilateral sacral alar fractures/osteoporosis  Orthopedic consultation  Pain control  Pathologic fractures by definition as no fall  No obvious signs of bone abnormalities other than osteoporosis    Hypertension  Home meds    Chronic systolic heart failure  EF of 46% on last echo  No signs of failure  Low-sodium diet  Monitor volume status    Abnormal retroperitoneal fat stranding on CT  Lipase and UA I    Coronary disease  Expectant management    Remote history of colon cancer    COPD    Essential tremor    Atrial fibrillation with chronic anticoagulation/supratherapeutic INR  Hold warfarin  Pharmacy to dose    DNR               # Coagulation Defect: home medication list includes an anticoagulant medication   # Hypertension: home medication list includes antihypertensive(s)            DVTP: Warfarin   Code Status: No CPR- Do NOT Intubate  Disposition: Inpatient   Expected LOS: 2 days   Goals for the hospitalization: pain control and safe disop     The patient's care was discussed with the Bedside Nurse, Patient and ER provider.    Chief Complaint  pelvic pain     HISTORY     Jori Humphreys is a 86 year old old female with h/o osteoporosis and atrial fibrillation on chronic anticoagulation presents to the hospital after 3 visits for medical care for pelvic pain.  Found to have bilateral sacral alar fractures.  Nontraumatic.  No fevers or chills.  Nausea vomiting.  No dysuria frequency urgency.  No abdominal pain.  Did have an episode of diarrhea prior to presenting to the hospital.   INR supratherapeutic.  No bleeding.  Remote history of coronary disease.  History of atrial fibrillation.  History of heart failure with reduced ejection fraction but stable.    Past Medical History     No past medical history on file.     Surgical History     Past Surgical History:   Procedure Laterality Date     ARTHROSCOPY SHOULDER ROTATOR CUFF REPAIR       CATARACT EXTRACTION Bilateral      HC REVISE MEDIAN N/CARPAL TUNNEL SURG Left 2019    Procedure: LEFT OPEN CARPAL TUNNEL RELEASE;  Surgeon: Alexis Fletcher MD;  Location: [facility] Main OR;  Service: Orthopedics     KNEE SURGERY Left      OTHER SURGICAL HISTORY      Colon cancer removed     NH INCISE FINGER TENDON SHEATH Left 2018    Procedure: LEFT MIDDLE TRIGGER FINGER RELEASE AND GANGLION CYST EXCISION;  Surgeon: Alexis Fletcher MD;  Location: [facility] Main OR;  Service: Orthopedics     NH SHLDR ARTHROSCOP,SURG,W/ROTAT CUFF REPR Left 2018    Procedure: LEFT SHOULDER ARTHROSCOPIC ROTATOR CUFF REPAIR, ARTHROSCOPIC DEBRIDEMENT AND SUBACHROMIAL DECOMPRESSION;  Surgeon: Alexis Fletcher MD;  Location: [facility] OR;  Service: Orthopedics     NH TEMPORAL ARTERY LIGATN OR BX Bilateral 12/10/2014    Procedure: TEMPORAL ARTERY BIOPSY BILATERAL;  Surgeon: Modesta Wilkerson MD;  Location: [facility] Main OR;  Service: ENT     RELEASE CARPAL TUNNEL  2011     RELEASE TRIGGER FINGER       Family History    Reviewed, and History reviewed. No pertinent family history.     Social History      Social History     Tobacco Use     Smoking status: Former Smoker     Packs/day: 0.50     Years: 60.00     Pack years: 30.00     Types: Cigarettes     Quit date: 2017     Years since quittin.5     Smokeless tobacco: Never Used   Substance Use Topics     Alcohol use: Yes     Alcohol/week: 4.2 standard drinks     Types: 5 Standard drinks or equivalent per week        Allergies     Allergies   Allergen Reactions     Alendronic Acid      Other reaction(s):  6/08, stomach bothered so quit early June     Codeine Nausea     Risedronate      Other reaction(s): 8/3/09 can't tolerate,  stomach upset     Prior to Admission Medications      Prior to Admission Medications   Prescriptions Last Dose Informant Patient Reported? Taking?   Multiple Vitamins-Minerals (PRESERVISION AREDS PO) 9/3/2022 at AM  Yes Yes   Sig: Take 1 tablet by mouth 2 times daily    Polyvinyl Alcohol-Povidone (REFRESH OP) 9/3/2022 at AM  Yes Yes   Sig: Apply 1 drop to eye as needed.   acetaminophen (TYLENOL) 325 MG tablet Past Week at Unknown time  Yes Yes   Sig: Take 650 mg by mouth every 6 hours as needed for mild pain   alendronate (FOSAMAX) 70 MG tablet 8/29/2022  Yes Yes   Sig: Take 70 mg by mouth every 7 days. Take in the morning on an empty stomach with a full glass of water 30 minutes before food   aspirin 81 MG EC tablet 9/3/2022 at AM  Yes Yes   Sig: Take 81 mg by mouth daily   budesonide-formoterol (SYMBICORT) 80-4.5 MCG/ACT Inhaler PRN  Yes Yes   Sig: Inhale 2 puffs into the lungs 2 times daily as needed for shortness of breath / dyspnea    calcium carbonate-vitamin D (OSCAL W/D) 500-200 MG-UNIT tablet 9/2/2022 at Unknown time  Yes Yes   Sig: Take 1 tablet by mouth 2 times daily   furosemide (LASIX) 20 MG tablet 9/1/2022 at AM  Yes Yes   Sig: Take 40 mg by mouth daily   ibuprofen (ADVIL/MOTRIN) 200 MG tablet Past Week at Unknown time  Yes Yes   Sig: Take 400 mg by mouth every 6 hours as needed for mild pain   lidocaine (LIDODERM) 5 % patch 9/2/2022 at Put patch on 9/3 PM took patch off 9/3 AM  Yes Yes   Sig: Place 1 patch onto the skin every 24 hours To prevent lidocaine toxicity, patient should be patch free for 12 hrs daily.   lisinopril (ZESTRIL) 20 MG tablet 9/3/2022 at AM  Yes Yes   Sig: Take 20 mg by mouth every morning   metoprolol succinate ER (TOPROL XL) 100 MG 24 hr tablet 9/3/2022 at AM  Yes Yes   Sig: Take 100 mg by mouth every morning   omeprazole (PRILOSEC) 20 MG DR capsule  9/1/2022 at AM  Yes Yes   Sig: Take 20 mg by mouth daily   oxyCODONE (ROXICODONE) 5 MG tablet 8/29/2022  Yes Yes   Sig: Take 5 mg by mouth every 6 hours as needed for severe pain   sertraline (ZOLOFT) 25 MG tablet 9/3/2022 at AM  Yes Yes   Sig: Take 25 mg by mouth daily    warfarin ANTICOAGULANT (COUMADIN) 4 MG tablet 9/3/2022 at AM  Yes Yes   Sig: Take 4 mg by mouth daily      Facility-Administered Medications: None      Review of Systems     A 12 point comprehensive review of systems was negative except as noted above in HPI.    PHYSICAL EXAMINATION       Vitals      Temp:  [97.9  F (36.6  C)] 97.9  F (36.6  C)  Pulse:  [60-62] 61  Resp:  [18-22] 18  BP: (117-148)/(79-96) 132/80  SpO2:  [93 %-96 %] 95 %    Examination     GENERAL:  Alert, appears comfortable, in no acute distress, appears stated age   HEAD:  Normocephalic, without obvious abnormality, atraumatic   EYES:  PERRL, conjunctiva/corneas clear, no scleral icterus, EOM's intact   NECK: Supple, symmetrical, trachea midline   BACK:   Symmetric, no curvature, ROM normal   LUNGS:   Clear to auscultation bilaterally, no rales, rhonchi, or wheezing, symmetric chest rise on inhalation, respirations unlabored   CHEST WALL:  No tenderness or deformity   HEART:  Regular rate and rhythm, S1 and S2 normal, no murmur, rub, or gallop    ABDOMEN:   Soft, non-tender, bowel sounds active all four quadrants, no masses, no organomegaly, no rebound or guarding   EXTREMITIES: Extremities normal, atraumatic, no cyanosis or edema    SKIN: Dry to touch, no exanthems in the visualized areas   NEURO: Alert, oriented x 4, moves all four extremities freely, non-focal   PSYCH: Cooperative, behavior is appropriate      Pertinent Lab     Most Recent 3 CBC's:Recent Labs   Lab Test 09/03/22  1654 09/22/21  1454 04/23/21  0515   WBC 6.9 7.1 5.7   HGB 13.4 12.9 12.1   MCV 99 90 95   * 155 170     Most Recent 3 BMP's:Recent Labs   Lab Test 09/03/22  1654 07/01/22  1509 10/20/21  1145     144 140   POTASSIUM 3.5 3.8 5.4*   CHLORIDE 105 105 105   CO2 23 23 23   BUN 32* 23.9* 20   CR 0.92 0.78 0.78   ANIONGAP 13 16* 12   NUVIA 9.6 9.2 9.4   * 111* 106     Most Recent 2 LFT's:Recent Labs   Lab Test 07/01/22  1509 09/22/21  1454 07/22/21  1630   AST  --  22 24   ALT 19 11 13   ALKPHOS 75 72 75   BILITOTAL 0.6 0.8 0.6     Most Recent 3 INR's:Recent Labs   Lab Test 09/03/22  1654 10/20/21  1141 04/23/21  0515   INR 4.38* 4.98* 2.36*         Pertinent Radiology     Radiology Results:   Recent Results (from the past 24 hour(s))   CTA Abdomen Pelvis with Contrast    Narrative    EXAM: CTA ABDOMEN PELVIS WITH CONTRAST  LOCATION: [facility]  DATE/TIME: 9/3/2022 7:29 PM    INDICATION: back pain, radiates to buttocks, AAA history, 3rd visit for back pain  COMPARISON: CTA 11/10/2021  TECHNIQUE: CT angiogram abdomen pelvis during arterial phase of injection of IV contrast. 2D and 3D MIP reconstructions were performed by the CT technologist. Dose reduction techniques were used.  CONTRAST: 100ml Isovue 370    FINDINGS:  ANGIOGRAM ABDOMEN/PELVIS: Prior aortobiiliac stent graft placement. Decreased size of excluded aneurysm sac, currently measuring 4.5 x 3.9 cm, previously 5.2 x 4.5 cm (series 5 image 193). No evidence of endoleak on this single phase exam. There is   extensive calcified and noncalcified atherosclerosis seen in the proximal thoracic and upper abdominal aorta without definite dissection or intramural hematoma. There is narrowing at the origin of the celiac axis, patent distally. Superior mesenteric   artery is widely patent. 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 Visualized portions of the common and superficial femoral arteries are patent. There is some subtle upper abdominal retroperitoneal fat   stranding without definitely visualized source.    LOWER CHEST: Markedly enlarged heart, particularly the right atrium  and right ventricle. Pacemaker leads partially visualized.    HEPATOBILIARY: Significant distention of the inferior vena cava and hepatic veins with reflux of contrast. No focal suspicious liver lesion.    PANCREAS: There is some subtle surrounding fat stranding. Questionable focus of ductal dilation in the pancreatic tail (series 5 image 126). No definite mass visualized.    SPLEEN: Normal.    ADRENAL GLANDS: Bilateral adrenal hyperplasia, increased since prior examination, without distinct nodule.    KIDNEYS/BLADDER: No hydronephrosis. Urinary bladder is unremarkable.    BOWEL: Prior partial sigmoid colectomy.    LYMPH NODES: No suspicious lymphadenopathy.    PELVIC ORGANS: Trace free fluid in the pelvis. Otherwise unremarkable.    MUSCULOSKELETAL: No acute bony abnormality.      Impression    IMPRESSION:  1.  Stable appearance of aortobiiliac stent graft with decreased size of excluded aneurysm sac.  2.  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 Distal common femoral artery is patent.  3.  Subtle upper abdominal retroperitoneal fat stranding without definite source visualized. Recommend correlation with urinalysis and lipase.  4.  Possible focal pancreatic ductal dilation in the tail, not definitively seen on prior examination. Consider follow-up pancreas protocol CT on a nonemergent basis for further evaluation.  5.  Findings of congestive heart failure with significant dilation of IVC and hepatic veins.   CT Pelvis Bone w Contrast    Narrative    EXAM: CT PELVIS BONE W CONTRAST  LOCATION: [facility]  DATE/TIME: 9/3/2022 7:32 PM    INDICATION: bilac si pain with pelvic compression  COMPARISON: 09/03/2022 CT abdomen pelvis. 09/01/2022 CT lumbar spine. 10/08/2021 CT abdomen pelvis.  TECHNIQUE: CT scan of the pelvis was performed with IV contrast. Multiplanar reformats were obtained. Dose reduction techniques were used.  CONTRAST: 100ml Isovue 370    FINDINGS:    BONE: Acute  nondisplaced bilateral sacral ala fractures. Acute fracture through the superior aspect of the S2 segment. No pubic rami fractures are evident. No evidence for avascular necrosis. No osseous lesions.    JOINTS: Degenerative changes in both hips with degenerative capsular calcinosis. Reactive subcortical cystic change in the left femoral head neck junction. Mild degenerative changes in the SI joints.    MUSCLES/TENDONS: No intramuscular hematoma. No retracted tendon tear.    SOFT TISSUES: No subcutaneous hematoma or fluid collection. Small amount of free fluid in the pelvis. Aortobiiliac graft. 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 Right renal cortical cyst.      Impression    IMPRESSION:  1.  Acute nondisplaced bilateral sacral alar fractures and the S2 segment.  2.  No pubic rami fractures are evident.  3.  Degenerative changes in both hips and SI joints.  4.  Small amount of free fluid in the pelvis.       EKG Results: not yet available.    Advance Care Planning        Ricky Gordon MD  [facility]   Phone: #[phone]

## 2022-09-04 NOTE — PHARMACY-ANTICOAGULATION SERVICE
Clinical Pharmacy - Warfarin Dosing Consult     Pharmacy has been consulted to manage this patient s warfarin therapy.  Indication: Atrial Fibrillation  Therapy Goal: INR 2-3  Provider/Team: ANA  Warfarin Prior to Admission: Yes  Warfarin PTA Regimen: 4mg qday  Significant drug interactions: asa  Dose Comments: INR = 4.39, hold dose    INR   Date Value Ref Range Status   09/04/2022 4.39 (H) 0.85 - 1.15 Final   09/03/2022 4.38 (H) 0.85 - 1.15 Final       Recommend warfarin 0mg today.  Pharmacy will monitor Jori BERRIOS Onofreconstantine daily and order warfarin doses to achieve specified goal.      Please contact pharmacy as soon as possible if the warfarin needs to be held for a procedure or if the warfarin goals change.

## 2022-09-04 NOTE — PROGRESS NOTES
09/04/22 1400   Quick Adds   Type of Visit Initial PT Evaluation   Living Environment   People in Home alone   Current Living Arrangements house   Home Accessibility stairs to enter home;stairs within home   Number of Stairs, Main Entrance 1   Number of Stairs, Within Home, Primary greater than 10 stairs  (7 stairs +7 stairs)   Stair Railings, Within Home, Primary railing on left side (ascending)   Living Environment Comments Pt reports moving very little at home due to pain for past week. Pt reports pain started 8/27   Self-Care   Equipment Currently Used at Home cane, straight   Fall history within last six months no   Activity/Exercise/Self-Care Comment Has FWW   General Information   Onset of Illness/Injury or Date of Surgery 09/03/22   Referring Physician Dr. Ricky Gordon   Patient/Family Therapy Goals Statement (PT) Be able to move without pain   Pertinent History of Current Problem (include personal factors and/or comorbidities that impact the POC) Bilateral sacral alar fx   Weight-Bearing Status - LLE full weight-bearing   Weight-Bearing Status - RLE full weight-bearing   Bed Mobility   Bed Mobility supine-sit   Supine-Sit Model (Bed Mobility) verbal cues;nonverbal cues (demo/gesture);moderate assist (50% patient effort);2 person assist   Impairments Contributing to Impaired Bed Mobility pain   Transfers   Transfers sit-stand transfer   Maintains Weight-bearing Status (Transfers) able to maintain   Sit-Stand Transfer   Sit-Stand Model (Transfers) minimum assist (75% patient effort);verbal cues;2 person assist   Assistive Device (Sit-Stand Transfers) walker, front-wheeled   Gait/Stairs (Locomotion)   Comment, (Gait/Stairs) Unable to ambulate due to pain   Clinical Impression   Criteria for Skilled Therapeutic Intervention Yes, treatment indicated   PT Diagnosis (PT) Impaired functional mobility   Influenced by the following impairments weakness, pain   Functional limitations due to  impairments transfers, gait, bed mobility   Clinical Presentation (PT Evaluation Complexity) Evolving/Changing   Clinical Presentation Rationale Pt presents as medically diagnosed   Clinical Decision Making (Complexity) moderate complexity   Planned Therapy Interventions (PT) gait training;home exercise program;bed mobility training;transfer training;strengthening;stair training;patient/family education   Anticipated Equipment Needs at Discharge (PT) walker, rolling   Risk & Benefits of therapy have been explained evaluation/treatment results reviewed;care plan/treatment goals reviewed;patient   PT Discharge Planning   PT Discharge Recommendation (DC Rec) (S)  Transitional Care Facility   PT Rationale for DC Rec Assist of 2 for bed mobility and transfers, unable to ambulate due to pain, has stairs to enter home and lives alone. If home: 24 hour assist of 1-2, wheelchair for mobility, FWW for transfers   Total Evaluation Time   Total Evaluation Time (Minutes) 10   Physical Therapy Goals   PT Frequency Daily   PT Predicted Duration/Target Date for Goal Attainment 09/11/22   PT Goals Transfers;Gait;Stairs   PT: Transfers Supervision/stand-by assist;Sit to/from stand;Assistive device   PT: Gait Rolling walker;50 feet  (CGA)   PT: Stairs Minimal assist;7 stairs;Rail on left

## 2022-09-04 NOTE — PLAN OF CARE
Goal Outcome Evaluation:    Problem: Plan of Care - These are the overarching goals to be used throughout the patient stay.    Goal: Plan of Care Review/Shift Note  Description: The Plan of Care Review/Shift note should be completed every shift.  The Outcome Evaluation is a brief statement about your assessment that the patient is improving, declining, or no change.  This information will be displayed automatically on your shift note.  Outcome: Ongoing, Progressing     Problem: Pain Acute  Goal: Acceptable Pain Control and Functional Ability  Outcome: Ongoing, Progressing    Patient reports persistent low back pain, which is controlled with Oxycodone 5mg prn.  Vitals stable.  Purewick in place, draining agustin colored urine.   Patient is on bedrest and is shifting weight in bed to maintain skin integrity.

## 2022-09-04 NOTE — UTILIZATION REVIEW
"Admission Status; Secondary Review Determination         Under the authority of the Utilization Management Committee, the utilization review process indicated a secondary review on the above patient.  The review outcome is based on review of the medical records, discussions with staff, and applying clinical experience noted on the date of the review.          (x) Observation Status Appropriate - This patient does not meet hospital inpatient criteria and is placed in observation status. If this patient's primary payer is Medicare and was admitted as an inpatient, Condition Code 44 should be used and patient status changed to \"observation\".     RATIONALE FOR DETERMINATION:  86 year old old female with history of COPD, atrial fibrillation on chronic anticoagulation, essential hypertension, and abdominal aortic aneurysm status postrepair presented to the ER complaining of pelvic and low back pain.  She had been seen multiple times and ultimately imaging revealed sacral alar fractures.  These fractures appear non-surgical.  Pain currently improved and receiving oral pain agents.    The severity of illness, intensity of service provided, expected LOS and risk for adverse outcome make the care appropriate for further observation; however, doesn't meet criteria for hospital inpatient admission. Dr. Gordon notified of this determination.    The information on this document is developed by the utilization review team in order for the business office to ensure compliance.  This only denotes the appropriateness of proper admission status and does not reflect the quality of care rendered.         The definitions of Inpatient Status and Observation Status used in making the determination above are those provided in the CMS Coverage Manual, Chapter 1 and Chapter 6, section 70.4.      Sincerely,    Nicholas Siegel DO, [facility]  Utilization Review  Physician Advisor  "

## 2022-09-04 NOTE — PROGRESS NOTES
[facility] MEDICINE PROGRESS NOTE      Identification/Summary: Jori Humphreys is a 86 year old female with a past medical history of cardiomyopathy with chronic systolic congestive heart failure, COPD, atrial fibrillation with chronic anticoagulation, and hypertension presenting complaining of low back and buttock pain found to have sacral alar fractures.    Assessment and Plan:  Bilateral sacral alar fractures/osteoporosis  Orthopedic consultation appreciated  Pain control  Pathologic fractures by definition as no fall  No obvious signs of bone abnormalities other than osteoporosis  PT and OT  TCU     Hypertension  Home meds     Chronic systolic heart failure  EF of 46% on last echo  No signs of failure  Low-sodium diet  Monitor volume status     Abnormal retroperitoneal fat stranding on CT  Lipase and UA I unremarkable     Coronary disease  Expectant management     Remote history of colon cancer     COPD     Essential tremor     Atrial fibrillation with chronic anticoagulation/supratherapeutic INR  Hold warfarin  Pharmacy to dose     DNR               # Coagulation Defect: home medication list includes an anticoagulant medication   # Hypertension: home medication list includes antihypertensive(s)          Diet: Combination Diet Regular Diet Adult; 2 gm NA Diet  DVT Prophylaxis:  warfarin  Code Status: No CPR- Do NOT Intubate    Anticipated possible discharge when TCU available     The patient's care was discussed with the Bedside Nurse and Patient.    Ricky Gordon MD  [facility] Medicine  [facility]  Phone: #[phone]    Interval History/Subjective:  Patient is doing okay.  Having a lot of pain this morning.  No fevers or chills.  No nausea or vomiting.  No chest pain.  Eating breakfast.  Hopes to go to TCU.  She does not feel she could get by on her own.    Physical Exam/Objective:  Temp:  [97.6  F (36.4  C)-98.1  F (36.7  C)] 98.1  F (36.7   C)  Pulse:  [60-76] 76  Resp:  [16-22] 18  BP: (110-148)/(77-96) 146/93  SpO2:  [93 %-97 %] 94 %  Body mass index is 20.63 kg/m .    GENERAL:  Alert, appears comfortable, in no acute distress, appears stated age   HEAD:  Normocephalic, without obvious abnormality, atraumatic   EYES:  PERRL, conjunctiva/corneas clear, no scleral icterus, EOM's intact   NECK: Supple, symmetrical, trachea midline   BACK:   Symmetric, no curvature, ROM normal   LUNGS:   Clear to auscultation bilaterally, no rales, rhonchi, or wheezing, symmetric chest rise on inhalation, respirations unlabored   HEART:  Regular rate and rhythm, S1 and S2 normal, no murmur, rub, or gallop    ABDOMEN:   Soft, non-tender, bowel sounds active all four quadrants, no masses, no organomegaly, no rebound or guarding   EXTREMITIES: Extremities normal, atraumatic, no cyanosis or edema    SKIN: Dry to touch, no exanthems in the visualized areas   NEURO: Alert, oriented x3, moves all four extremities freely   PSYCH: Cooperative, behavior is appropriate      Data reviewed today: I personally reviewed all new medications, labs, imaging/diagnostics reports over the past 24 hours. Pertinent findings include:    Imaging:   Recent Results (from the past 24 hour(s))   CTA Abdomen Pelvis with Contrast    Narrative    EXAM: CTA ABDOMEN PELVIS WITH CONTRAST  LOCATION: [facility]  DATE/TIME: 9/3/2022 7:29 PM    INDICATION: back pain, radiates to buttocks, AAA history, 3rd visit for back pain  COMPARISON: CTA 11/10/2021  TECHNIQUE: CT angiogram abdomen pelvis during arterial phase of injection of IV contrast. 2D and 3D MIP reconstructions were performed by the CT technologist. Dose reduction techniques were used.  CONTRAST: 100ml Isovue 370    FINDINGS:  ANGIOGRAM ABDOMEN/PELVIS: Prior aortobiiliac stent graft placement. Decreased size of excluded aneurysm sac, currently measuring 4.5 x 3.9 cm, previously 5.2 x 4.5 cm (series 5 image 193). No  evidence of endoleak on this single phase exam. There is   extensive calcified and noncalcified atherosclerosis seen in the proximal thoracic and upper abdominal aorta without definite dissection or intramural hematoma. There is narrowing at the origin of the celiac axis, patent distally. Superior mesenteric   artery is widely patent. 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 Visualized portions of the common and superficial femoral arteries are patent. There is some subtle upper abdominal retroperitoneal fat   stranding without definitely visualized source.    LOWER CHEST: Markedly enlarged heart, particularly the right atrium and right ventricle. Pacemaker leads partially visualized.    HEPATOBILIARY: Significant distention of the inferior vena cava and hepatic veins with reflux of contrast. No focal suspicious liver lesion.    PANCREAS: There is some subtle surrounding fat stranding. Questionable focus of ductal dilation in the pancreatic tail (series 5 image 126). No definite mass visualized.    SPLEEN: Normal.    ADRENAL GLANDS: Bilateral adrenal hyperplasia, increased since prior examination, without distinct nodule.    KIDNEYS/BLADDER: No hydronephrosis. Urinary bladder is unremarkable.    BOWEL: Prior partial sigmoid colectomy.    LYMPH NODES: No suspicious lymphadenopathy.    PELVIC ORGANS: Trace free fluid in the pelvis. Otherwise unremarkable.    MUSCULOSKELETAL: No acute bony abnormality.      Impression    IMPRESSION:  1.  Stable appearance of aortobiiliac stent graft with decreased size of excluded aneurysm sac.  2.  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 Distal common femoral artery is patent.  3.  Subtle upper abdominal retroperitoneal fat stranding without definite source visualized. Recommend correlation with urinalysis and lipase.  4.  Possible focal pancreatic ductal dilation in the tail, not definitively seen on prior examination.  Consider follow-up pancreas protocol CT on a nonemergent basis for further evaluation.  5.  Findings of congestive heart failure with significant dilation of IVC and hepatic veins.   CT Pelvis Bone w Contrast    Narrative    EXAM: CT PELVIS BONE W CONTRAST  LOCATION: [facility]  DATE/TIME: 9/3/2022 7:32 PM    INDICATION: bilac si pain with pelvic compression  COMPARISON: 09/03/2022 CT abdomen pelvis. 09/01/2022 CT lumbar spine. 10/08/2021 CT abdomen pelvis.  TECHNIQUE: CT scan of the pelvis was performed with IV contrast. Multiplanar reformats were obtained. Dose reduction techniques were used.  CONTRAST: 100ml Isovue 370    FINDINGS:    BONE: Acute nondisplaced bilateral sacral ala fractures. Acute fracture through the superior aspect of the S2 segment. No pubic rami fractures are evident. No evidence for avascular necrosis. No osseous lesions.    JOINTS: Degenerative changes in both hips with degenerative capsular calcinosis. Reactive subcortical cystic change in the left femoral head neck junction. Mild degenerative changes in the SI joints.    MUSCLES/TENDONS: No intramuscular hematoma. No retracted tendon tear.    SOFT TISSUES: No subcutaneous hematoma or fluid collection. Small amount of free fluid in the pelvis. Aortobiiliac graft. 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 Right renal cortical cyst.      Impression    IMPRESSION:  1.  Acute nondisplaced bilateral sacral alar fractures and the S2 segment.  2.  No pubic rami fractures are evident.  3.  Degenerative changes in both hips and SI joints.  4.  Small amount of free fluid in the pelvis.     Head CT w/o contrast    Narrative    EXAM: CT HEAD W/O CONTRAST  LOCATION: [facility]  DATE/TIME: 9/3/2022 9:36 PM    INDICATION: Trauma  COMPARISON: MRI brain 11/10/2018.  TECHNIQUE: Routine CT Head without IV contrast. Multiplanar reformats. Dose reduction techniques were  used.    FINDINGS:  INTRACRANIAL CONTENTS: Intravenous contrast from the earlier same day contrast-enhanced examinations. No intracranial hemorrhage, extraaxial collection, or mass effect.  No CT evidence of acute infarct. Moderate presumed chronic small vessel ischemic   changes. Moderate generalized volume loss. No hydrocephalus.     VISUALIZED ORBITS/SINUSES/MASTOIDS: No intraorbital abnormality. No significant paranasal sinus mucosal disease. No middle ear or mastoid effusion.    BONES/SOFT TISSUES: No acute abnormality. No acute calvarial fracture on the 3-D reconstruction.      Impression    IMPRESSION:  1.  No acute intracranial process.       Labs:  Most Recent 3 CBC's:Recent Labs   Lab Test 09/04/22  0910 09/03/22  1654 09/22/21  1454   WBC 6.6 6.9 7.1   HGB 14.1 13.4 12.9   MCV 99 99 90    145* 155     Most Recent 3 BMP's:Recent Labs   Lab Test 09/04/22  0910 09/03/22  2341 09/03/22  1654 07/01/22  1509     --  141 144   POTASSIUM 3.9 4.0 3.5 3.8   CHLORIDE 108*  --  105 105   CO2 21*  --  23 23   BUN 29*  --  32* 23.9*   CR 0.75  --  0.92 0.78   ANIONGAP 13  --  13 16*   NUVIA 9.5  --  9.6 9.2   *  --  149* 111*     Most Recent 2 LFT's:Recent Labs   Lab Test 07/01/22  1509 09/22/21  1454 07/22/21  1630   AST  --  22 24   ALT 19 11 13   ALKPHOS 75 72 75   BILITOTAL 0.6 0.8 0.6     Most Recent 3 INR's:Recent Labs   Lab Test 09/04/22  0910 09/03/22  1654 10/20/21  1141   INR 4.39* 4.38* 4.98*       Medications:   Personally Reviewed.  Medications     - MEDICATION INSTRUCTIONS -         aspirin  81 mg Oral Daily     calcium carbonate-vitamin D  1 tablet Oral BID     furosemide  40 mg Oral Daily     Lidocaine  1 patch Transdermal Q24H     lisinopril  20 mg Oral QAM     metoprolol succinate ER  100 mg Oral QAM     pantoprazole  40 mg Oral QAM AC     senna-docusate  1 tablet Oral BID    Or     senna-docusate  2 tablet Oral BID     sertraline  25 mg Oral Daily     sodium chloride (PF)  3 mL  Intracatheter Q8H     Warfarin Therapy Reminder  1 each Oral See Admin Instructions     warfarin-No DOSE today  1 each Does not apply no dose today (warfarin)

## 2022-09-04 NOTE — CONSULTS
Care Management Follow Up    Length of Stay (days): 1    Expected Discharge Date:  TBD     Concerns to be Addressed:  sacral alar fractures    Patient plan of care discussed at interdisciplinary rounds: Yes    Anticipated Discharge Disposition:  TBD     Anticipated Discharge Services: Pending clinical progress (possible TCU)    Anticipated Discharge DME: Pending clinical progress    Patient/family educated on Medicare website which has current facility and service quality ratings: no    Education Provided on the Discharge Plan:  AVS per bedside RN.    Patient/Family in Agreement with the Plan: yes      Additional Information:  Chart reviewed. CM following for discharge planning. Awaiting PT and OT evaluations. CM consult for rehab placement. CM met with patient and she is agreeable to TCU if covered by insurance. CM discussed that only certain TCU's accept [employer] insurance. Patient is agreeable to referrals being sent to those facilities. Patient states she does not have family in the area and would most likely need transportation arranged. CM will follow for discharge planning.     Referrals sent with patient agreement  [facility] ()  [facility] ([facility])  [facility] ([facility])      CM notified of change to observation status. MOON given to patient at 3:23pm. Copy given to patient.     Nusrat Miguel RN

## 2022-09-04 NOTE — CONSULTS
ORTHOPEDIC CONSULTATION    CHIEF COMPLAINT: low back/pelvis pain      HISTORY OF PRESENT ILLNESS:  The patient is seen in orthopedic consultation at the request of Ricky Arango MD.  The patient is a 86 year old female with c/o low back/pelvis pain.  Jori has been struggling wit progressive low back and posterior pelvis pain for 1 week without inciting trauma. Presented to the ED yesterday due to persistent pain. Is currently in the process of selling her house and trying to move to [place] to be with her sister and her . Denies any history of fall.       PAST MEDICAL HISTORY:   History reviewed. No pertinent past medical history.    ALLERGIES:      Allergies   Allergen Reactions     Alendronic Acid      Other reaction(s): , stomach bothered so quit early      Codeine Nausea     Risedronate      Other reaction(s): 8/3/09 can't tolerate,  stomach upset        MEDICATIONS ON ADMISSION:  Medications were reviewed.  They do include:   (Not in a hospital admission)      SOCIAL HISTORY:   Social History     Tobacco Use     Smoking status: Former Smoker     Packs/day: 0.50     Years: 60.00     Pack years: 30.00     Types: Cigarettes     Quit date: 2017     Years since quittin.5     Smokeless tobacco: Never Used   Substance Use Topics     Alcohol use: Yes     Alcohol/week: 4.2 standard drinks     Types: 5 Standard drinks or equivalent per week        FAMILY HISTORY:  History reviewed. No pertinent family history.    REVIEW OF SYSTEMS:   See Admission History and Physical     PHYSICAL EXAMINATION:    Temp:  [97.6  F (36.4  C)-98.1  F (36.7  C)] 98.1  F (36.7  C)  Pulse:  [60-76] 76  Resp:  [16-22] 18  BP: (110-148)/(77-96) 146/93  SpO2:  [93 %-97 %] 94 %    General: On examination, the patient is A&Ox3  SKIN/HAIR/NAILS: normal   Pulses:  Dorsalis pedis pulses intact  Sensation: intact bilateral lower extremities  Tenderness: posterior hemipelvis, increased pain with pelvis compression. Log  roll of the hips tolerated with mild pain at terminal internal rotation.  ROM: Forward flexion of the hips intact without pain, full knee and ankle motion distally.   Crepitus: none  Pain with ROM: no  Instability: no  Motor: intact to bilateral LE  Contralateral side= Full range of motion, Negative joint instability findings, 5/5 motor groups about the joint, Non-tender.     RADIOGRAPHIC EVALUATION:    CT Pelvis Bone w Contrast     Impression     IMPRESSION:  1.  Acute nondisplaced bilateral sacral alar fractures and the S2 segment.  2.  No pubic rami fractures are evident.  3.  Degenerative changes in both hips and SI joints.  4.  Small amount of free fluid in the pelvis.              LABORATORY DATA:   Lab Results   Component Value Date    INR 4.38 (H) 09/03/2022       IMPRESSION:  Non displaced sacral alar fractures, atraumatic, neurologically intact     PLAN:  Images and plan discussed today with Dr. Alexis Mays of [facility]. No surgical management for the sacral alar fracture. Weight bear as tolerated with assistive device (patient has walker at home).  Pain control with OTC analgesics preferred to decrease risk of confusion. Follow up with orthopedics in 2 weeks.     Thank you for including [facility] in the care of Jori Humphreys.     Yesenia De La Cruz PA-C    ADDENDUM:    I discussed the patient with the PA.  I reviewed the CT scan imaging and this does demonstrate a transverse fracture as well through S2 on the sagittal addition of bilateral sacral ala fractures.  As such, this can be indicative of a sacral U-type fracture.  In her case, this is a insufficiency fracture and is atraumatic in nature.    I did page the [facility] on-call orthopedic surgeon for review of the case.  I discussed the case with the on-call orthopedic traumatologist there.  Given no neurologic deficits, nondisplaced, and atraumatic, they recommended mobilization and nonoperative management.  If the patient develops  neurologic deficits or significant displacement of fracture, I would then send her to [facility] for further management and consideration of surgery.    Plan for weightbearing as tolerated with walker.  Please page orthopedic surgery should she develop any neurologic deficits, numbness or tingling, bowel or bladder deficits.    Plan for outpatient follow-up in 2 weeks for repeat x-rays.  Sooner if there are issues.      SARABJIT HENNING MD

## 2022-09-04 NOTE — CONSULTS
Care Management Initial Consult    General Information  Assessment completed with: Patient,    Type of CM/SW Visit: Initial Assessment    Primary Care Provider verified and updated as needed: Yes   Readmission within the last 30 days: no previous admission in last 30 days      Reason for Consult: discharge planning  Advance Care Planning: Advance Care Planning Reviewed: questions answered, other (see comments) (Declined Honoring Choices information)     General Information Comments: Lives alone    Communication Assessment  Patient's communication style: spoken language (English or Bilingual)    Hearing Difficulty or Deaf: no   Wear Glasses or Blind: yes    Cognitive  Cognitive/Neuro/Behavioral: WDL                      Living Environment:   People in home: alone     Current living Arrangements: condominium ([facility])      Able to return to prior arrangements:  (Pending clinical progress)  Living Arrangement Comments: Lives alone, may need TCU    Family/Social Support:  Care provided by: self  Provides care for: no one  Marital Status: Single  Sibling(s), Neighbor          Description of Support System: Supportive    Support Assessment: Lacks adequate physical care    Current Resources:   Patient receiving home care services: No     Community Resources: None  Equipment currently used at home: cane, straight, walker, standard  Supplies currently used at home: None    Employment/Financial:  Employment Status: retired        Financial Concerns: No concerns identified         Lifestyle & Psychosocial Needs:  Social Determinants of Health     Tobacco Use: Medium Risk     Smoking Tobacco Use: Former Smoker     Smokeless Tobacco Use: Never Used   Alcohol Use: Not on file   Financial Resource Strain: Not on file   Food Insecurity: Not on file   Transportation Needs: Not on file   Physical Activity: Not on file   Stress: Not on file   Social Connections: Not on file   Intimate Partner Violence: Not on file   Depression: Not  on file   Housing Stability: Not on file       Functional Status:  Prior to admission patient needed assistance:   Dependent ADLs:: Ambulation-cane, Ambulation-walker  Dependent IADLs:: Independent (Had been independent prior to this visit)       Mental Health Status:          Chemical Dependency Status:              Values/Beliefs:  Spiritual, Cultural Beliefs, Bahai Practices, Values that affect care:                 Additional Information:  Alert oriented patient lives alone in a town home. Presents to  ED for a second time after developing worsening low back and pelvic pain for past week. Found to have bilateral sacral alar fractures.  Nontraumatic. Prior to this issue surfacing this last week, patient had been independent with I/ADLs. Generally, only uses a cane but due to increased pain she has used a walker this past week. No home care services; does drive. Declined Honoring Choices information.     Patient has [employer] insurance and if recommended wants to go to a TCU because she s planning a move to [place] with her sister and wants to be independent by the time her house sells. Would consider home services if TCU not approved. Patient has several friends/neighbors who have offered to transport her on discharge. Other needs pending clinical progress and PT recommendations.      JONI WELCH, RN/CM

## 2022-09-04 NOTE — PHARMACY-ADMISSION MEDICATION HISTORY
Pharmacy Note - Admission Medication History    Pertinent Provider Information:   - Patient just finished a course of prednisone 20mg daily    - oxycodone was prescribed at [facility] urgent care on 8/28   ______________________________________________________________________    Prior To Admission (PTA) med list completed and updated in EMR.       PTA Med List   Medication Sig Note Last Dose     acetaminophen (TYLENOL) 325 MG tablet Take 650 mg by mouth every 6 hours as needed for mild pain  Past Week at Unknown time     alendronate (FOSAMAX) 70 MG tablet Take 70 mg by mouth every 7 days. Take in the morning on an empty stomach with a full glass of water 30 minutes before food  8/29/2022     aspirin 81 MG EC tablet Take 81 mg by mouth daily  9/3/2022 at AM     budesonide-formoterol (SYMBICORT) 80-4.5 MCG/ACT Inhaler Inhale 2 puffs into the lungs 2 times daily as needed for shortness of breath / dyspnea   PRN     calcium carbonate-vitamin D (OSCAL W/D) 500-200 MG-UNIT tablet Take 1 tablet by mouth 2 times daily  9/2/2022 at Unknown time     furosemide (LASIX) 20 MG tablet Take 40 mg by mouth daily  9/1/2022 at AM     ibuprofen (ADVIL/MOTRIN) 200 MG tablet Take 400 mg by mouth every 6 hours as needed for mild pain  Past Week at Unknown time     lidocaine (LIDODERM) 5 % patch Place 1 patch onto the skin every 24 hours To prevent lidocaine toxicity, patient should be patch free for 12 hrs daily.  9/2/2022 at Put patch on 9/3 PM took patch off 9/3 AM     lisinopril (ZESTRIL) 20 MG tablet Take 20 mg by mouth every morning  9/3/2022 at AM     metoprolol succinate ER (TOPROL XL) 100 MG 24 hr tablet Take 100 mg by mouth every morning  9/3/2022 at AM     Multiple Vitamins-Minerals (PRESERVISION AREDS PO) Take 1 tablet by mouth 2 times daily   9/3/2022 at AM     omeprazole (PRILOSEC) 20 MG DR capsule Take 20 mg by mouth daily  9/1/2022 at AM     oxyCODONE (ROXICODONE) 5 MG tablet Take 5 mg by mouth every 6 hours as needed for  severe pain 9/3/2022: Patient out of oxycodone 8/29/2022     Polyvinyl Alcohol-Povidone (REFRESH OP) Apply 1 drop to eye as needed.  9/3/2022 at AM     sertraline (ZOLOFT) 25 MG tablet Take 25 mg by mouth daily   9/3/2022 at AM     warfarin ANTICOAGULANT (COUMADIN) 4 MG tablet Take 4 mg by mouth daily  9/3/2022 at AM       Information source(s): Patient and CareEverywhere/SureScripts  Method of interview communication: in-person    Summary of Changes to PTA Med List  New: lidocaine patch, ibuprofen, tylenol, furosemide, metoprolol, omeprazole, oxycodone, aspirin  Discontinued: none  Changed: none    Patient was asked about OTC/herbal products specifically.  PTA med list reflects this.    In the past week, patient estimated taking medication this percent of the time:  greater than 90%.    Allergies were reviewed, assessed, and updated with the patient.      Patient does not anticipate needing any multi-use medications during admission.    The information provided in this note is only as accurate as the sources available at the time of the update(s).    Thank you for the opportunity to participate in the care of this patient.    Beatriz Garcia RPH  9/3/2022 7:27 PM

## 2022-09-05 ENCOUNTER — APPOINTMENT (OUTPATIENT)
Dept: OCCUPATIONAL THERAPY | Facility: CLINIC | Age: 87
DRG: 543 | End: 2022-09-05
Attending: FAMILY MEDICINE
Payer: COMMERCIAL

## 2022-09-05 LAB
HOLD SPECIMEN: NORMAL
INR PPP: 3.93 (ref 0.85–1.15)
MAGNESIUM SERPL-MCNC: 1.6 MG/DL (ref 1.8–2.6)
POTASSIUM BLD-SCNC: 3.6 MMOL/L (ref 3.5–5)

## 2022-09-05 PROCEDURE — 36415 COLL VENOUS BLD VENIPUNCTURE: CPT | Performed by: FAMILY MEDICINE

## 2022-09-05 PROCEDURE — G0378 HOSPITAL OBSERVATION PER HR: HCPCS

## 2022-09-05 PROCEDURE — 120N000001 HC R&B MED SURG/OB

## 2022-09-05 PROCEDURE — 85610 PROTHROMBIN TIME: CPT | Performed by: FAMILY MEDICINE

## 2022-09-05 PROCEDURE — 84132 ASSAY OF SERUM POTASSIUM: CPT | Performed by: FAMILY MEDICINE

## 2022-09-05 PROCEDURE — 250N000013 HC RX MED GY IP 250 OP 250 PS 637: Performed by: FAMILY MEDICINE

## 2022-09-05 PROCEDURE — 99232 SBSQ HOSP IP/OBS MODERATE 35: CPT | Performed by: FAMILY MEDICINE

## 2022-09-05 PROCEDURE — 97535 SELF CARE MNGMENT TRAINING: CPT | Mod: GO

## 2022-09-05 PROCEDURE — 97166 OT EVAL MOD COMPLEX 45 MIN: CPT | Mod: GO

## 2022-09-05 PROCEDURE — 83735 ASSAY OF MAGNESIUM: CPT | Performed by: FAMILY MEDICINE

## 2022-09-05 RX ORDER — SODIUM CHLORIDE 9 MG/ML
INJECTION, SOLUTION INTRAVENOUS CONTINUOUS
Status: DISCONTINUED | OUTPATIENT
Start: 2022-09-05 | End: 2022-09-05

## 2022-09-05 RX ADMIN — OXYCODONE HYDROCHLORIDE 5 MG: 5 TABLET ORAL at 06:18

## 2022-09-05 RX ADMIN — OXYCODONE HYDROCHLORIDE 5 MG: 5 TABLET ORAL at 03:00

## 2022-09-05 RX ADMIN — Medication 1 TABLET: at 09:02

## 2022-09-05 RX ADMIN — Medication 1 TABLET: at 21:10

## 2022-09-05 RX ADMIN — SERTRALINE HYDROCHLORIDE 25 MG: 25 TABLET, FILM COATED ORAL at 09:02

## 2022-09-05 RX ADMIN — POLYETHYLENE GLYCOL AND PROPYLENE GLYCOL 2 DROP: 4; 3 SOLUTION/ DROPS OPHTHALMIC at 11:41

## 2022-09-05 RX ADMIN — FUROSEMIDE 40 MG: 40 TABLET ORAL at 09:02

## 2022-09-05 RX ADMIN — OXYCODONE HYDROCHLORIDE 2.5 MG: 5 TABLET ORAL at 11:44

## 2022-09-05 RX ADMIN — METOPROLOL SUCCINATE 100 MG: 100 TABLET, EXTENDED RELEASE ORAL at 09:02

## 2022-09-05 RX ADMIN — LIDOCAINE 1 PATCH: 246 PATCH TOPICAL at 21:10

## 2022-09-05 RX ADMIN — PANTOPRAZOLE SODIUM 40 MG: 20 TABLET, DELAYED RELEASE ORAL at 06:18

## 2022-09-05 RX ADMIN — ASPIRIN 81 MG: 81 TABLET, COATED ORAL at 09:02

## 2022-09-05 RX ADMIN — LISINOPRIL 20 MG: 10 TABLET ORAL at 09:02

## 2022-09-05 ASSESSMENT — ACTIVITIES OF DAILY LIVING (ADL)
ADLS_ACUITY_SCORE: 38
PREVIOUS_RESPONSIBILITIES: MEAL PREP;HOUSEKEEPING;LAUNDRY;MEDICATION MANAGEMENT
ADLS_ACUITY_SCORE: 38

## 2022-09-05 NOTE — PLAN OF CARE
PRIMARY DIAGNOSIS: ACUTE PAIN  OUTPATIENT/OBSERVATION GOALS TO BE MET BEFORE DISCHARGE:  1. Pain Status: Improved-controlled with oral pain medications.    2. Return to near baseline physical activity: No    3. Cleared for discharge by consultants (if involved): No    Discharge Planner Nurse   Safe discharge environment identified: No  Barriers to discharge: Yes, TCU placement       Entered by: Larua Smyth RN 09/05/2022 5:06 PM     Please review provider order for any additional goals.   Nurse to notify provider when observation goals have been met and patient is ready for discharge.Goal Outcome Evaluation:

## 2022-09-05 NOTE — PROGRESS NOTES
PRIMARY DIAGNOSIS: ACUTE PAIN  OUTPATIENT/OBSERVATION GOALS TO BE MET BEFORE DISCHARGE:  1. Pain Status: Improved-controlled with oral pain medications.    2. Return to near baseline physical activity: No    3. Cleared for discharge by consultants (if involved): No    Discharge Planner Nurse   Safe discharge environment identified: No  Barriers to discharge: Yes, TCU placement.       Entered by: Miguel Soto RN 09/05/2022 2:00 AM     Please review provider order for any additional goals.   Nurse to notify provider when observation goals have been met and patient is ready for discharge.

## 2022-09-05 NOTE — PLAN OF CARE
PRIMARY DIAGNOSIS: ACUTE PAIN  OUTPATIENT/OBSERVATION GOALS TO BE MET BEFORE DISCHARGE:  1. Pain Status: Improved-controlled with oral pain medications.    2. Return to near baseline physical activity: No    3. Cleared for discharge by consultants (if involved): No    Discharge Planner Nurse   Safe discharge environment identified: No  Barriers to discharge: Yes, TCU placement       Entered by: Laura Smyth RN 09/05/2022 5:07 PM     Please review provider order for any additional goals.   Nurse to notify provider when observation goals have been met and patient is ready for discharge.Goal Outcome Evaluation:

## 2022-09-05 NOTE — PROGRESS NOTES
[facility] MEDICINE PROGRESS NOTE      Identification/Summary:   Jori Humphreys is a 86 year old female with a past medical history of cardiomyopathy with chronic systolic congestive heart failure, COPD, atrial fibrillation with chronic anticoagulation, and hypertension presenting complaining of low back and buttock pain found to have sacral alar fractures.  No acute issues 9/5 but still having pain and awaiting TCU placement.     Assessment and Plan:  Bilateral sacral alar fractures/osteoporosis  Orthopedic consultation appreciated  Pain control  Pathologic fractures by definition as no fall  No obvious signs of bone abnormalities other than osteoporosis  PT and OT  TCU     Hypertension  Home meds     Chronic systolic heart failure  EF of 46% on last echo  No signs of failure  Low-sodium diet  Monitor volume status     Abnormal retroperitoneal fat stranding on CT  Lipase and UA I unremarkable     Coronary disease  Expectant management     Remote history of colon cancer     COPD     Essential tremor     Atrial fibrillation with chronic anticoagulation/supratherapeutic INR  Hold warfarin  Pharmacy to dose     DNR             # Coagulation Defect: home medication list includes an anticoagulant medication   # Hypertension: home medication list includes antihypertensive(s)          Diet: Combination Diet Regular Diet Adult; 2 gm NA Diet  DVT Prophylaxis:  {warfarin  Code Status: No CPR- Do NOT Intubate    Anticipated possible discharge once TCU available  Disposition Plan      Expected Discharge Date: 09/06/2022      Destination: home with help/services;nursing home (TCU)  Discharge Comments: TCU placement  insurance auth        The patient's care was discussed with the Bedside Nurse, Care Coordinator/ and Patient.    Ricky Gordon MD  [facility]  [facility]  [facility]  Phone: #[phone]    Interval History/Subjective:  No issues today.   Having a fair amount of pain but improved with oxycodone.  Worked with therapy today.  She needs a TCU.  She states that she cannot take care of herself currently.    Physical Exam/Objective:  Temp:  [97.4  F (36.3  C)-98  F (36.7  C)] 97.4  F (36.3  C)  Pulse:  [60-68] 60  Resp:  [18] 18  BP: (125-140)/() 131/91  SpO2:  [91 %-96 %] 91 %  Body mass index is 21.68 kg/m .    GENERAL:  Alert, appears comfortable, in no acute distress, appears stated age   HEAD:  Normocephalic, without obvious abnormality, atraumatic   EYES:  PERRL, conjunctiva/corneas clear, no scleral icterus, EOM's intact   NOSE: Nares normal, septum midline, mucosa normal, no drainage   LUNGS:   Clear to auscultation bilaterally, no rales, rhonchi, or wheezing, symmetric chest rise on inhalation, respirations unlabored   CHEST WALL:  No tenderness or deformity   HEART:  Regular rate and rhythm, S1 and S2 normal, no murmur, rub, or gallop    ABDOMEN:   Soft, non-tender, bowel sounds active all four quadrants, no masses, no organomegaly, no rebound or guarding   EXTREMITIES: Extremities normal, atraumatic, no cyanosis or edema    SKIN: Dry to touch, no exanthems in the visualized areas   NEURO: Alert, oriented x3, moves all four extremities freely   PSYCH: Cooperative, behavior is appropriate      Data reviewed today: I personally reviewed all new medications, labs, imaging/diagnostics reports over the past 24 hours. Pertinent findings include:    Imaging:   No results found for this or any previous visit (from the past 24 hour(s)).    Labs:  Most Recent 3 CBC's:Recent Labs   Lab Test 09/04/22  0910 09/03/22  1654 09/22/21  1454   WBC 6.6 6.9 7.1   HGB 14.1 13.4 12.9   MCV 99 99 90    145* 155     Most Recent 3 BMP's:Recent Labs   Lab Test 09/05/22  0630 09/04/22  0910 09/03/22  2341 09/03/22  1654 07/01/22  1509   NA  --  142  --  141 144   POTASSIUM 3.6 3.9 4.0 3.5 3.8   CHLORIDE  --  108*  --  105 105   CO2  --  21*  --  23 23   BUN   --  29*  --  32* 23.9*   CR  --  0.75  --  0.92 0.78   ANIONGAP  --  13  --  13 16*   NUVIA  --  9.5  --  9.6 9.2   GLC  --  131*  --  149* 111*     Most Recent 2 LFT's:Recent Labs   Lab Test 07/01/22  1509 09/22/21  1454 07/22/21  1630   AST  --  22 24   ALT 19 11 13   ALKPHOS 75 72 75   BILITOTAL 0.6 0.8 0.6     Most Recent 3 INR's:Recent Labs   Lab Test 09/05/22  0630 09/04/22  0910 09/03/22  1654   INR 3.93* 4.39* 4.38*       Medications:   Personally Reviewed.  Medications     - MEDICATION INSTRUCTIONS -         aspirin  81 mg Oral Daily     calcium carbonate-vitamin D  1 tablet Oral BID     furosemide  40 mg Oral Daily     Lidocaine  1 patch Transdermal Q24H     lisinopril  20 mg Oral QAM     metoprolol succinate ER  100 mg Oral QAM     pantoprazole  40 mg Oral QAM AC     senna-docusate  1 tablet Oral BID    Or     senna-docusate  2 tablet Oral BID     sertraline  25 mg Oral Daily     sodium chloride (PF)  3 mL Intracatheter Q8H     Warfarin Therapy Reminder  1 each Oral See Admin Instructions

## 2022-09-05 NOTE — PROGRESS NOTES
PRIMARY DIAGNOSIS: ACUTE PAIN  OUTPATIENT/OBSERVATION GOALS TO BE MET BEFORE DISCHARGE:  1. Pain Status: Improved-controlled with oral pain medications.    2. Return to near baseline physical activity: No    3. Cleared for discharge by consultants (if involved): No    Discharge Planner Nurse   Safe discharge environment identified: No  Barriers to discharge: Yes, TCU placement.       Entered by: Miguel Soto RN 09/05/2022 0000     Please review provider order for any additional goals.   Nurse to notify provider when observation goals have been met and patient is ready for discharge.

## 2022-09-05 NOTE — PLAN OF CARE
PRIMARY DIAGNOSIS: ACUTE PAIN  OUTPATIENT/OBSERVATION GOALS TO BE MET BEFORE DISCHARGE:  1. Pain Status: Improved-controlled with oral pain medications.    2. Return to near baseline physical activity: No    3. Cleared for discharge by consultants (if involved): No    Discharge Planner Nurse   Safe discharge environment identified: No  Barriers to discharge: Yes, TCU placement       Entered by: Laura Smyth RN 09/05/2022 5:04 PM     Please review provider order for any additional goals.   Nurse to notify provider when observation goals have been met and patient is ready for discharge.Goal Outcome Evaluation:

## 2022-09-05 NOTE — PROGRESS NOTES
PRIMARY DIAGNOSIS: ACUTE PAIN  OUTPATIENT/OBSERVATION GOALS TO BE MET BEFORE DISCHARGE:  1. Pain Status: Improved-controlled with oral pain medications.     2. Return to near baseline physical activity: No     3. Cleared for discharge by consultants (if involved): No     Discharge Planner Nurse   Safe discharge environment identified: No  Barriers to discharge: Yes, TCU placement.       Entered by: Miguel Soto RN 09/05/2022 [id number]     Please review provider order for any additional goals.   Nurse to notify provider when observation goals have been met and patient is ready for discharge.

## 2022-09-06 ENCOUNTER — APPOINTMENT (OUTPATIENT)
Dept: OCCUPATIONAL THERAPY | Facility: CLINIC | Age: 87
DRG: 543 | End: 2022-09-06
Payer: COMMERCIAL

## 2022-09-06 ENCOUNTER — APPOINTMENT (OUTPATIENT)
Dept: PHYSICAL THERAPY | Facility: CLINIC | Age: 87
DRG: 543 | End: 2022-09-06
Payer: COMMERCIAL

## 2022-09-06 LAB
INR PPP: 3.03 (ref 0.85–1.15)
MAGNESIUM SERPL-MCNC: 1.6 MG/DL (ref 1.8–2.6)
POTASSIUM BLD-SCNC: 3.8 MMOL/L (ref 3.5–5)

## 2022-09-06 PROCEDURE — 36415 COLL VENOUS BLD VENIPUNCTURE: CPT | Performed by: FAMILY MEDICINE

## 2022-09-06 PROCEDURE — 99232 SBSQ HOSP IP/OBS MODERATE 35: CPT | Performed by: FAMILY MEDICINE

## 2022-09-06 PROCEDURE — G0378 HOSPITAL OBSERVATION PER HR: HCPCS

## 2022-09-06 PROCEDURE — 120N000001 HC R&B MED SURG/OB

## 2022-09-06 PROCEDURE — 84132 ASSAY OF SERUM POTASSIUM: CPT | Performed by: FAMILY MEDICINE

## 2022-09-06 PROCEDURE — 83735 ASSAY OF MAGNESIUM: CPT | Performed by: FAMILY MEDICINE

## 2022-09-06 PROCEDURE — 97110 THERAPEUTIC EXERCISES: CPT | Mod: GP

## 2022-09-06 PROCEDURE — 97116 GAIT TRAINING THERAPY: CPT | Mod: GP

## 2022-09-06 PROCEDURE — 250N000013 HC RX MED GY IP 250 OP 250 PS 637: Performed by: FAMILY MEDICINE

## 2022-09-06 PROCEDURE — 97535 SELF CARE MNGMENT TRAINING: CPT | Mod: GO

## 2022-09-06 PROCEDURE — 85610 PROTHROMBIN TIME: CPT | Performed by: FAMILY MEDICINE

## 2022-09-06 RX ORDER — WARFARIN SODIUM 3 MG/1
3 TABLET ORAL
Status: COMPLETED | OUTPATIENT
Start: 2022-09-06 | End: 2022-09-06

## 2022-09-06 RX ADMIN — LIDOCAINE 1 PATCH: 246 PATCH TOPICAL at 21:01

## 2022-09-06 RX ADMIN — LISINOPRIL 20 MG: 10 TABLET ORAL at 08:14

## 2022-09-06 RX ADMIN — OXYCODONE HYDROCHLORIDE 2.5 MG: 5 TABLET ORAL at 01:49

## 2022-09-06 RX ADMIN — ACETAMINOPHEN 975 MG: 325 TABLET, FILM COATED ORAL at 17:44

## 2022-09-06 RX ADMIN — DOCUSATE SODIUM AND SENNOSIDES 2 TABLET: 50; 8.6 TABLET ORAL at 19:58

## 2022-09-06 RX ADMIN — ASPIRIN 81 MG: 81 TABLET, COATED ORAL at 08:13

## 2022-09-06 RX ADMIN — ACETAMINOPHEN 975 MG: 325 TABLET, FILM COATED ORAL at 08:28

## 2022-09-06 RX ADMIN — Medication 1 TABLET: at 19:57

## 2022-09-06 RX ADMIN — FUROSEMIDE 40 MG: 40 TABLET ORAL at 08:15

## 2022-09-06 RX ADMIN — PANTOPRAZOLE SODIUM 40 MG: 20 TABLET, DELAYED RELEASE ORAL at 06:33

## 2022-09-06 RX ADMIN — METOPROLOL SUCCINATE 100 MG: 100 TABLET, EXTENDED RELEASE ORAL at 08:17

## 2022-09-06 RX ADMIN — Medication 1 TABLET: at 08:17

## 2022-09-06 RX ADMIN — DOCUSATE SODIUM AND SENNOSIDES 1 TABLET: 50; 8.6 TABLET ORAL at 08:27

## 2022-09-06 RX ADMIN — WARFARIN SODIUM 3 MG: 3 TABLET ORAL at 17:37

## 2022-09-06 RX ADMIN — SERTRALINE HYDROCHLORIDE 25 MG: 25 TABLET, FILM COATED ORAL at 08:16

## 2022-09-06 ASSESSMENT — ACTIVITIES OF DAILY LIVING (ADL)
ADLS_ACUITY_SCORE: 38

## 2022-09-06 NOTE — CONSULTS
"ACUPUNCTURIST TREATMENT NOTE    Name: Jori Humphreys  :  1935  MRN:  [id number]    Acupuncture Treatment  Patient Type: Orthopedic  Intervention Reason: Pain  Pain Location: tailbone  Pre-session Pain Ratin  Post-session Pain Ratin  Patient complaint:: tailbone pain  Initial insertions: Du 20, Yin Costa, BL 60, BL 62, BL65         \"Risks and benefits of acupuncture were discussed with patient. Consent for treatment was given. We thank you for the referral.\"     Shauna Hutton L.Ac.     Date:  2022  Time:  12:35 PM    "

## 2022-09-06 NOTE — PROGRESS NOTES
[facility]      OUTPATIENT PHYSICAL THERAPY EVALUATION  PLAN OF TREATMENT FOR OUTPATIENT REHABILITATION  (COMPLETE FOR INITIAL CLAIMS ONLY)  Patient's Last Name, First Name, M.I.  YOB: 1935  Jori Humphreys                        Provider's Name  [facility] Medical Record No.  [id number]                               Onset Date:  09/03/22   Start of Care Date:         Type:     _X_PT   ___OT   ___SLP Medical Diagnosis:                           PT Diagnosis:  Impaired functional mobility   Visits from SOC:  1   _________________________________________________________________________________  Plan of Treatment/Functional Goals    Planned Interventions: gait training, home exercise program, bed mobility training, transfer training, strengthening, stair training, patient/family education     Goals: See Physical Therapy Goals on Care Plan in [facility] electronic health record.    Therapy Frequency: Daily  Predicted Duration of Therapy Intervention: 09/11/22  _________________________________________________________________________________    I CERTIFY THE NEED FOR THESE SERVICES FURNISHED UNDER        THIS PLAN OF TREATMENT AND WHILE UNDER MY CARE     (Physician co-signature of this document indicates review and certification of the therapy plan).              Certification date from: (P) 09/04/22,      Referring Physician: Dr. Ricky Gordon            Initial Assessment        See Physical Therapy evaluation dated   in Epic electronic health record.

## 2022-09-06 NOTE — PLAN OF CARE
PRIMARY DIAGNOSIS: ACUTE PAIN  OUTPATIENT/OBSERVATION GOALS TO BE MET BEFORE DISCHARGE:  1. Pain Status: Improved-controlled with oral pain medications.    2. Return to near baseline physical activity: No    3. Cleared for discharge by consultants (if involved): No    Discharge Planner Nurse   Safe discharge environment identified: No  Barriers to discharge: Yes, TCU placement       Entered by: Laura Smyth RN 09/06/2022 6:42 PM     Please review provider order for any additional goals.   Nurse to notify provider when observation goals have been met and patient is ready for discharge.Goal Outcome Evaluation:

## 2022-09-06 NOTE — PROGRESS NOTES
Care Management Follow Up    Length of Stay (days): 1    Expected Discharge Date: 09/07/2022     Concerns to be Addressed: coping/stress, discharge planning, home safety  Lives alone  Patient plan of care discussed at interdisciplinary rounds: Yes    Anticipated Discharge Disposition: Home Care, Skilled Nursing Facility, Transitional Care     Anticipated Discharge Services: Other (see comment) (Pending clinical progress)  Anticipated Discharge DME: Other (see comment) (Pending clinical progress)    Patient/family educated on Medicare website which has current facility and service quality ratings: no  Education Provided on the Discharge Plan:    Patient/Family in Agreement with the Plan: yes    Referrals Placed by CM/SW:  TCU   Private pay costs discussed: Not at this time.     Additional Information:  BILLY called and spoke with Yenny at [facility] and is assessing.     4:06 PM  Yenny requested therapy notes and was faxed.  Yenny will put in for [employer] Auth.    JORGITO Crawford

## 2022-09-06 NOTE — PROGRESS NOTES
[facility] MEDICINE PROGRESS NOTE      Identification/Summary:   Jori Humphreys is a 86 year old female with a past medical history of cardiomyopathy with chronic systolic congestive heart failure, COPD, atrial fibrillation with chronic anticoagulation, and hypertension presenting complaining of low back and buttock pain found to have sacral alar fractures.  No acute issues 9/6 but still having pain and awaiting TCU placement.     Assessment and Plan:  Bilateral sacral alar fractures/osteoporosis  Orthopedic consultation appreciated  Pain control  Pathologic fractures by definition as no fall  No obvious signs of bone abnormalities other than osteoporosis  PT and OT  TCU soon     Hypertension  Home meds     Chronic systolic heart failure  EF of 46% on last echo  No signs of failure  Low-sodium diet  Monitor volume status  Euvolemic     Abnormal retroperitoneal fat stranding on CT  Lipase and UA I unremarkable  No abdominal issues     Coronary disease  Expectant management     Remote history of colon cancer     COPD     Essential tremor     Atrial fibrillation with chronic anticoagulation/supratherapeutic INR  Hold warfarin  Pharmacy to dose     DNR                 # Coagulation Defect: home medication list includes an anticoagulant medication   # Hypertension: home medication list includes antihypertensive(s)          Diet: Combination Diet Regular Diet Adult; 2 gm NA Diet  warfarin  Code Status: No CPR- Do NOT Intubate    Anticipated possible discharge as soon as TCU available.  Disposition Plan      Expected Discharge Date: 09/07/2022      Destination: home with help/services;nursing home (TCU)  Discharge Comments: TCU placement  insurance auth        The patient's care was discussed with the Bedside Nurse, Care Coordinator/ and Patient.    Ricky Gordon MD  [facility]  [facility]  [facility]  Phone: #[phone]    Interval  History/Subjective:  No acute issues.  Pain is slowly improving.  She hopes to go to TCU soon.  Still endorses not being able to go home.    Physical Exam/Objective:  Temp:  [97.6  F (36.4  C)-98.2  F (36.8  C)] 97.6  F (36.4  C)  Pulse:  [60-68] 60  Resp:  [16-17] 17  BP: (118-162)/() 118/88  SpO2:  [91 %-96 %] 96 %  Body mass index is 21.68 kg/m .    GENERAL:  Alert, appears comfortable, in no acute distress, appears stated age   HEAD:  Normocephalic, without obvious abnormality, atraumatic   EYES:  PERRL, conjunctiva/corneas clear, no scleral icterus, EOM's intact   NECK: Supple, symmetrical, trachea midline   BACK:   Symmetric, no curvature, ROM normal   LUNGS:   Clear to auscultation bilaterally, no rales, rhonchi, or wheezing, symmetric chest rise on inhalation, respirations unlabored   CHEST WALL:  No tenderness or deformity   HEART:  Regular rate and rhythm, S1 and S2 normal, no murmur, rub, or gallop    ABDOMEN:   Soft, non-tender, bowel sounds active all four quadrants, no masses, no organomegaly, no rebound or guarding   EXTREMITIES: Extremities normal, atraumatic, no cyanosis or edema    SKIN: Dry to touch, no exanthems in the visualized areas   NEURO: Alert, oriented x3, moves all four extremities freely   PSYCH: Cooperative, behavior is appropriate      Data reviewed today: I personally reviewed all new medications, labs, imaging/diagnostics reports over the past 24 hours. Pertinent findings include:    Imaging:   No results found for this or any previous visit (from the past 24 hour(s)).    Labs:  Most Recent 3 CBC's:Recent Labs   Lab Test 09/04/22  0910 09/03/22  1654 09/22/21  1454   WBC 6.6 6.9 7.1   HGB 14.1 13.4 12.9   MCV 99 99 90    145* 155     Most Recent 3 BMP's:Recent Labs   Lab Test 09/06/22  0712 09/05/22  0630 09/04/22  0910 09/03/22  2341 09/03/22  1654 07/01/22  1509   NA  --   --  142  --  141 144   POTASSIUM 3.8 3.6 3.9   < > 3.5 3.8   CHLORIDE  --   --  108*  --  105  105   CO2  --   --  21*  --  23 23   BUN  --   --  29*  --  32* 23.9*   CR  --   --  0.75  --  0.92 0.78   ANIONGAP  --   --  13  --  13 16*   NUVIA  --   --  9.5  --  9.6 9.2   GLC  --   --  131*  --  149* 111*    < > = values in this interval not displayed.     Most Recent 2 LFT's:Recent Labs   Lab Test 07/01/22  1509 09/22/21  1454 07/22/21  1630   AST  --  22 24   ALT 19 11 13   ALKPHOS 75 72 75   BILITOTAL 0.6 0.8 0.6     Most Recent 3 INR's:Recent Labs   Lab Test 09/06/22  0712 09/05/22  0630 09/04/22  0910   INR 3.03* 3.93* 4.39*       Medications:   Personally Reviewed.  Medications     - MEDICATION INSTRUCTIONS -         aspirin  81 mg Oral Daily     calcium carbonate-vitamin D  1 tablet Oral BID     furosemide  40 mg Oral Daily     Lidocaine  1 patch Transdermal Q24H     lisinopril  20 mg Oral QAM     metoprolol succinate ER  100 mg Oral QAM     pantoprazole  40 mg Oral QAM AC     senna-docusate  1 tablet Oral BID    Or     senna-docusate  2 tablet Oral BID     sertraline  25 mg Oral Daily     sodium chloride (PF)  3 mL Intracatheter Q8H     warfarin ANTICOAGULANT  3 mg Oral ONCE at 18:00     Warfarin Therapy Reminder  1 each Oral See Admin Instructions

## 2022-09-06 NOTE — PROGRESS NOTES
"Orthopedic Progress Note      Assessment:    Non displaced sacral alar fractures, atraumatic, neurologically intact    Plan:   - Continue PT/OT  - Weightbearing status: WBAT with assistive device  - Anticoagulation: 81 mg Aspirin.  - Discharge planning: TCU - awaiting placement  - [facility] if she develops any neurologic deficits, numbness, tingling or bowel or bladder deficits    Subjective:  Pain: reports pain with movement but at rest has no pain  Chest pain, SOB: none  Nausea, Vomiting:  No  Lightheadedness, Dizziness:  No  Neuro:  Patient denies new onset numbness or paresthesias    Patient is laying comfortably in bed this morning. She reported some pain with movement this morning but while at rest denies pain. She had not yet met with therapy today.     Objective:  BP (!) 162/118 (BP Location: Right arm)   Pulse 68   Temp 97.6  F (36.4  C) (Oral)   Resp 16   Ht 1.651 m (5' 5\")   Wt 59.1 kg (130 lb 4.8 oz)   SpO2 96%   BMI 21.68 kg/m    The patient is A&Ox3. Appears comfortable.   Sensation is intact.  Dorsiflexion and plantar flexion is intact.  Dorsalis pedis pulse intact.  Calves are soft and non-tender. Negative Shaq's.    Pertinent Labs   Lab Results: personally reviewed.   Lab Results   Component Value Date    INR 3.03 (H) 09/06/2022    INR 3.93 (H) 09/05/2022    INR 4.39 (H) 09/04/2022     Lab Results   Component Value Date    WBC 6.6 09/04/2022    HGB 14.1 09/04/2022    HCT 42.5 09/04/2022    MCV 99 09/04/2022     09/04/2022     Lab Results   Component Value Date     09/04/2022    CO2 21 (L) 09/04/2022       Report completed by:  Joan Lebron PA-C/Dr. Mays  [facility]    Date: 9/6/2022  Time: 10:31 AM  "

## 2022-09-06 NOTE — PLAN OF CARE
Goal Outcome Evaluation:    PRIMARY DIAGNOSIS: ACUTE PAIN  OUTPATIENT/OBSERVATION GOALS TO BE MET BEFORE DISCHARGE:  1. Pain Status: Improved-controlled with oral pain medications.    2. Return to near baseline physical activity: No    3. Cleared for discharge by consultants (if involved): No    Discharge Planner Nurse   Safe discharge environment identified: No  Barriers to discharge: Yes; TCU placement       Entered by: Kenya Ortega RN 09/06/2022 2:56 AM     Please review provider order for any additional goals.   Nurse to notify provider when observation goals have been met and patient is ready for discharge.

## 2022-09-06 NOTE — PLAN OF CARE
Goal Outcome Evaluation:    PRIMARY DIAGNOSIS: ACUTE PAIN  OUTPATIENT/OBSERVATION GOALS TO BE MET BEFORE DISCHARGE:  1. Pain Status: Improved-controlled with oral pain medications. PRN oxy effective in managing back pain    2. Return to near baseline physical activity: No, Ax1 pivot to commode    3. Cleared for discharge by consultants (if involved): No    Discharge Planner Nurse   Safe discharge environment identified: No  Barriers to discharge: Yes; pending TCU placement/insurance authorization       Entered by: Kenya Ortega RN 09/06/2022 5:09 AM     Please review provider order for any additional goals.   Nurse to notify provider when observation goals have been met and patient is ready for discharge.

## 2022-09-06 NOTE — PLAN OF CARE
PRIMARY DIAGNOSIS: ACUTE PAIN  OUTPATIENT/OBSERVATION GOALS TO BE MET BEFORE DISCHARGE:  1. Pain Status: Improved-controlled with oral pain medications.    2. Return to near baseline physical activity: No, Ax1 pivot to commode    3. Cleared for discharge by consultants (if involved): No    Discharge Planner Nurse   Safe discharge environment identified: No  Barriers to discharge: Yes; TCU placement       Entered by: Kenya Ortega RN 09/05/2022 10:22 PM     Please review provider order for any additional goals.   Nurse to notify provider when observation goals have been met and patient is ready for discharge.

## 2022-09-06 NOTE — PROGRESS NOTES
PRIMARY DIAGNOSIS: ACUTE PAIN  OUTPATIENT/OBSERVATION GOALS TO BE MET BEFORE DISCHARGE:  1. Pain Status: Improved-controlled with oral pain medications.    2. Return to near baseline physical activity: No    3. Cleared for discharge by consultants (if involved): No    Discharge Planner Nurse   Safe discharge environment identified: No  Barriers to discharge: Yes, waiting placement for TCU       Entered by: Henny Dalal RN 09/06/2022 3:25 PM     Please review provider order for any additional goals.   Nurse to notify provider when observation goals have been met and patient is ready for discharge.

## 2022-09-06 NOTE — PLAN OF CARE
PRIMARY DIAGNOSIS: ACUTE PAIN  OUTPATIENT/OBSERVATION GOALS TO BE MET BEFORE DISCHARGE:  1. Pain Status: Improved-controlled with oral pain medications.    2. Return to near baseline physical activity: No    3. Cleared for discharge by consultants (if involved): No    Discharge Planner Nurse   Safe discharge environment identified: No  Barriers to discharge: Yes, TCU placement       Entered by: Henny Dalal RN 09/06/2022 3:22 PM     Please review provider order for any additional goals.   Nurse to notify provider when observation goals have been met and patient is ready for discharge.Goal Outcome Evaluation:

## 2022-09-07 ENCOUNTER — APPOINTMENT (OUTPATIENT)
Dept: OCCUPATIONAL THERAPY | Facility: CLINIC | Age: 87
DRG: 543 | End: 2022-09-07
Payer: COMMERCIAL

## 2022-09-07 LAB
INR PPP: 3.13 (ref 0.85–1.15)
MAGNESIUM SERPL-MCNC: 1.7 MG/DL (ref 1.8–2.6)
POTASSIUM BLD-SCNC: 3.6 MMOL/L (ref 3.5–5)

## 2022-09-07 PROCEDURE — 84132 ASSAY OF SERUM POTASSIUM: CPT | Performed by: FAMILY MEDICINE

## 2022-09-07 PROCEDURE — 250N000013 HC RX MED GY IP 250 OP 250 PS 637: Performed by: FAMILY MEDICINE

## 2022-09-07 PROCEDURE — 99232 SBSQ HOSP IP/OBS MODERATE 35: CPT | Performed by: FAMILY MEDICINE

## 2022-09-07 PROCEDURE — 97535 SELF CARE MNGMENT TRAINING: CPT | Mod: GO

## 2022-09-07 PROCEDURE — 36415 COLL VENOUS BLD VENIPUNCTURE: CPT | Performed by: FAMILY MEDICINE

## 2022-09-07 PROCEDURE — 120N000001 HC R&B MED SURG/OB

## 2022-09-07 PROCEDURE — 83735 ASSAY OF MAGNESIUM: CPT | Performed by: FAMILY MEDICINE

## 2022-09-07 PROCEDURE — 85610 PROTHROMBIN TIME: CPT | Performed by: FAMILY MEDICINE

## 2022-09-07 RX ADMIN — FUROSEMIDE 40 MG: 40 TABLET ORAL at 08:24

## 2022-09-07 RX ADMIN — LISINOPRIL 20 MG: 10 TABLET ORAL at 08:23

## 2022-09-07 RX ADMIN — OXYCODONE HYDROCHLORIDE 5 MG: 5 TABLET ORAL at 00:57

## 2022-09-07 RX ADMIN — SERTRALINE HYDROCHLORIDE 25 MG: 25 TABLET, FILM COATED ORAL at 08:22

## 2022-09-07 RX ADMIN — METOPROLOL SUCCINATE 100 MG: 100 TABLET, EXTENDED RELEASE ORAL at 08:23

## 2022-09-07 RX ADMIN — ACETAMINOPHEN 975 MG: 325 TABLET, FILM COATED ORAL at 00:57

## 2022-09-07 RX ADMIN — ASPIRIN 81 MG: 81 TABLET, COATED ORAL at 08:23

## 2022-09-07 RX ADMIN — DOCUSATE SODIUM AND SENNOSIDES 1 TABLET: 50; 8.6 TABLET ORAL at 19:12

## 2022-09-07 RX ADMIN — PANTOPRAZOLE SODIUM 40 MG: 20 TABLET, DELAYED RELEASE ORAL at 05:01

## 2022-09-07 RX ADMIN — LIDOCAINE 1 PATCH: 246 PATCH TOPICAL at 21:54

## 2022-09-07 RX ADMIN — Medication 1 TABLET: at 19:12

## 2022-09-07 RX ADMIN — Medication 1 TABLET: at 08:21

## 2022-09-07 RX ADMIN — OXYCODONE HYDROCHLORIDE 5 MG: 5 TABLET ORAL at 05:01

## 2022-09-07 RX ADMIN — OXYCODONE HYDROCHLORIDE 5 MG: 5 TABLET ORAL at 19:07

## 2022-09-07 RX ADMIN — DOCUSATE SODIUM AND SENNOSIDES 2 TABLET: 50; 8.6 TABLET ORAL at 08:22

## 2022-09-07 ASSESSMENT — ACTIVITIES OF DAILY LIVING (ADL)
ADLS_ACUITY_SCORE: 34
ADLS_ACUITY_SCORE: 33
ADLS_ACUITY_SCORE: 34
ADLS_ACUITY_SCORE: 33
ADLS_ACUITY_SCORE: 34
ADLS_ACUITY_SCORE: 33
ADLS_ACUITY_SCORE: 34

## 2022-09-07 NOTE — PLAN OF CARE
"PRIMARY DIAGNOSIS: \"GENERIC\" NURSING  OUTPATIENT/OBSERVATION GOALS TO BE MET BEFORE DISCHARGE:  ADLs back to baseline: No    Activity and level of assistance: Assist of one with walker    Pain status: Improved-controlled with oral pain medications.    Return to near baseline physical activity: Yes, participating with therapy      Discharge Planner Nurse   Safe discharge environment identified: Yes  Barriers to discharge: Yes, Pending Cerenifede Marroquin        Entered by: Henny Dalal RN 09/07/2022 10:16 AM         "

## 2022-09-07 NOTE — PLAN OF CARE
"Patient pain control with medications. Patient stated that she had full  BLE sensation. Pedal pulses were +2. Patient stated last BM was 9/3/22. PRN Senna was administered. Active sound in all four quadrants. Patients stated that is passing gas and \"feels that she will have a BM any moment now\". Will continue to monitor.      "

## 2022-09-07 NOTE — PROGRESS NOTES
PRIMARY DIAGNOSIS: ACUTE PAIN  OUTPATIENT/OBSERVATION GOALS TO BE MET BEFORE DISCHARGE:  1. Pain Status: Improved-controlled with oral pain medications. Pt needed 5mg of oxycodone. Oxycodone effective. Pt states that they're comfortable, despite rating their pain a 6/10.   2. Return to near baseline physical activity: No     3. Cleared for discharge by consultants (if involved): No     Discharge Planner Nurse   Safe discharge environment identified: No  Barriers to discharge: Yes, TCU placement       Entered by:Darrius Miller RN 09/07/2022 [id number]  Please review provider order for any additional goals.   Nurse to notify provider when observation goals have been met and patient is ready for discharge.

## 2022-09-07 NOTE — PROGRESS NOTES
[facility] MEDICINE PROGRESS NOTE      Identification/Summary:   Jori Humphreys is a 86 year old female with a past medical history of cardiomyopathy with chronic systolic congestive heart failure, COPD, atrial fibrillation with chronic anticoagulation, and hypertension presenting complaining of low back and buttock pain found to have sacral alar fractures.  No acute issues 9/7 but still having pain and awaiting TCU placement.  Likely will not discharge until 9/8.     Assessment and Plan:  Bilateral sacral alar fractures/osteoporosis  Orthopedic consultation appreciated  Pain control  Pathologic fractures by definition as no fall  No obvious signs of bone abnormalities other than osteoporosis  PT and OT  TCU soon when available     Hypertension  Home meds     Chronic systolic heart failure  EF of 46% on last echo  No signs of failure  Low-sodium diet  Monitor volume status  Euvolemic     Abnormal retroperitoneal fat stranding on CT  Lipase and UA I unremarkable  No abdominal issues     Coronary disease  Expectant management     Remote history of colon cancer     COPD     Essential tremor     Atrial fibrillation with chronic anticoagulation/supratherapeutic INR  Hold warfarin  Pharmacy to dose  Nearing therapeutic range     DNR    Diet: Combination Diet Regular Diet Adult; 2 gm NA Diet  DVT Prophylaxis: Warfarin  Code Status: No CPR- Do NOT Intubate    Anticipated possible discharge as soon as TCU and insurance authorization available  Disposition Plan      Expected Discharge Date: 09/08/2022,  9:00 AM    Destination: home with help/services;nursing home (TCU)  Discharge Comments: TCU placement  insurance auth        The patient's care was discussed with the Bedside Nurse, Care Coordinator/ and Patient.    Ricky Gordon MD  [facility] Medicine  [facility]  Phone: #[phone]    Interval History/Subjective:  Patient is worried about  getting a ride to the facility.  Apparently there is a facility that has accepted her but we are awaiting insurance authorization.  She has no fevers or chills.  No nausea or vomiting.  Still pain but is improving daily.    Physical Exam/Objective:  Temp:  [96.2  F (35.7  C)-98.1  F (36.7  C)] 96.2  F (35.7  C)  Pulse:  [61-73] 69  Resp:  [16-17] 17  BP: (116-156)/() 151/109  SpO2:  [94 %-97 %] 97 %  Body mass index is 22.27 kg/m .    GENERAL:  Alert, appears comfortable, in no acute distress, appears stated age   HEAD:  Normocephalic, without obvious abnormality, atraumatic   EYES:  PERRL, conjunctiva/corneas clear, no scleral icterus, EOM's intact   LUNGS:   Clear to auscultation bilaterally, no rales, rhonchi, or wheezing, symmetric chest rise on inhalation, respirations unlabored   CHEST WALL:  No tenderness or deformity   HEART:  Regular rate and rhythm, S1 and S2 normal, no murmur, rub, or gallop    ABDOMEN:   Soft, non-tender, bowel sounds active all four quadrants, no masses, no organomegaly, no rebound or guarding   EXTREMITIES: Extremities normal, atraumatic, no cyanosis or edema    SKIN: Dry to touch, no exanthems in the visualized areas   NEURO: Alert, oriented x3, moves all four extremities freely   PSYCH: Cooperative, behavior is appropriate      Data reviewed today: I personally reviewed all new medications, labs, imaging/diagnostics reports over the past 24 hours. Pertinent findings include:    Imaging:   No results found for this or any previous visit (from the past 24 hour(s)).    Labs:  Most Recent 3 CBC's:Recent Labs   Lab Test 09/04/22  0910 09/03/22  1654 09/22/21  1454   WBC 6.6 6.9 7.1   HGB 14.1 13.4 12.9   MCV 99 99 90    145* 155     Most Recent 3 BMP's:Recent Labs   Lab Test 09/07/22  0539 09/06/22  0712 09/05/22  0630 09/04/22  0910 09/03/22  2341 09/03/22  1654 07/01/22  1509   NA  --   --   --  142  --  141 144   POTASSIUM 3.6 3.8 3.6 3.9   < > 3.5 3.8   CHLORIDE  --   --    --  108*  --  105 105   CO2  --   --   --  21*  --  23 23   BUN  --   --   --  29*  --  32* 23.9*   CR  --   --   --  0.75  --  0.92 0.78   ANIONGAP  --   --   --  13  --  13 16*   NUVIA  --   --   --  9.5  --  9.6 9.2   GLC  --   --   --  131*  --  149* 111*    < > = values in this interval not displayed.     Most Recent 2 LFT's:Recent Labs   Lab Test 07/01/22  1509 09/22/21  1454 07/22/21  1630   AST  --  22 24   ALT 19 11 13   ALKPHOS 75 72 75   BILITOTAL 0.6 0.8 0.6     Most Recent 3 INR's:Recent Labs   Lab Test 09/07/22  0539 09/06/22  0712 09/05/22  0630   INR 3.13* 3.03* 3.93*       Medications:   Personally Reviewed.  Medications     - MEDICATION INSTRUCTIONS -         aspirin  81 mg Oral Daily     calcium carbonate-vitamin D  1 tablet Oral BID     furosemide  40 mg Oral Daily     Lidocaine  1 patch Transdermal Q24H     lisinopril  20 mg Oral QAM     metoprolol succinate ER  100 mg Oral QAM     pantoprazole  40 mg Oral QAM AC     senna-docusate  1 tablet Oral BID    Or     senna-docusate  2 tablet Oral BID     sertraline  25 mg Oral Daily     sodium chloride (PF)  3 mL Intracatheter Q8H     Warfarin Therapy Reminder  1 each Oral See Admin Instructions

## 2022-09-07 NOTE — PROGRESS NOTES
PRIMARY DIAGNOSIS: ACUTE PAIN  OUTPATIENT/OBSERVATION GOALS TO BE MET BEFORE DISCHARGE:  1. Pain Status: Improved-controlled with oral pain medications. Pt required 5 mg dose of oxycodone.  Pt states that they're comfortable, despite rating their pain a 6/10.     2. Return to near baseline physical activity: No     3. Cleared for discharge by consultants (if involved): No     Discharge Planner Nurse   Safe discharge environment identified: No  Barriers to discharge: Yes, TCU placement. Pt's last BM was on 9/03/2022. Nurse educated pt on complications of constipation. Nurse encouraged pt to drink fluids and ambulate to promote BM. Pt stated understanding. Nurse placed physician sticky note for PRN laxatives.        Entered by:Darrius Miller RN 09/07/2022 1141  Please review provider order for any additional goals.   Nurse to notify provider when observation goals have been met and patient is ready for discharge.

## 2022-09-07 NOTE — PROGRESS NOTES
PRIMARY DIAGNOSIS: ACUTE PAIN  OUTPATIENT/OBSERVATION GOALS TO BE MET BEFORE DISCHARGE:  1. Pain Status: Improved-controlled with oral pain medications.     2. Return to near baseline physical activity: No     3. Cleared for discharge by consultants (if involved): No     Discharge Planner Nurse   Safe discharge environment identified: No  Barriers to discharge: Yes, TCU placement       Entered by:Darrius Miller RN 09/06/2022 9:42 PM  Please review provider order for any additional goals.   Nurse to notify provider when observation goals have been met and patient is ready for discharge.

## 2022-09-07 NOTE — PROGRESS NOTES
PRIMARY DIAGNOSIS: ACUTE PAIN  OUTPATIENT/OBSERVATION GOALS TO BE MET BEFORE DISCHARGE:  1. Pain Status: Improved-controlled with oral pain medications. Pt refused PRN pain medications and ice. Pt states that they're comfortable, despite rating their pain a 6/10. Pt states they will take pain medications in the morning.      2. Return to near baseline physical activity: No     3. Cleared for discharge by consultants (if involved): No     Discharge Planner Nurse   Safe discharge environment identified: No  Barriers to discharge: Yes, TCU placement       Entered by:Darrius Miller RN 09/07/2022 0000  Please review provider order for any additional goals.   Nurse to notify provider when observation goals have been met and patient is ready for discharge.

## 2022-09-07 NOTE — PROGRESS NOTES
Patient received alert and oriented, complained of mild pain but she declined any pain meds. Was calm in her bed watching television. Patient reported that she had a big bowel movement after the senna given by the day shift. Patient has got sensation in both lower limbs. Awaiting transfer to TCU

## 2022-09-07 NOTE — PROGRESS NOTES
"Orthopedic Progress Note      Assessment:    Non displaced sacral alar fractures, atraumatic, neurologically intact    Plan:   - Continue PT/OT  - Weightbearing status: WBAT with assistive device  - Anticoagulation: per hospitalist, on Coumadin  - Discharge planning: TCU - awaiting placement. Ortho discharge orders placed.  - Page ortho if she develops any neurologic deficits, numbness, tingling or bowel or bladder deficits    Subjective:  Pain: reports pain with movement but at rest has no pain  Chest pain, SOB: none  Nausea, Vomiting:  No  Lightheadedness, Dizziness:  No  Neuro:  Patient denies new onset numbness or paresthesias    Patient upset about possibly discharging today. Concerned she won't have clothes to wear and can't get a friend to help her pack clothes. Otherwise, no new concerns.    Objective:  BP (!) 151/109   Pulse 69   Temp (!) 96.2  F (35.7  C)   Resp 17   Ht 1.651 m (5' 5\")   Wt 60.7 kg (133 lb 12.8 oz)   SpO2 97%   BMI 22.27 kg/m    The patient is A&Ox3. Appears comfortable.   Sensation is intact.  Dorsiflexion and plantar flexion is intact.  Dorsalis pedis pulse intact.  Calves are soft and non-tender. Negative Shaq's.    Pertinent Labs   Lab Results: personally reviewed.   Lab Results   Component Value Date    INR 3.13 (H) 09/07/2022    INR 3.03 (H) 09/06/2022    INR 3.93 (H) 09/05/2022     Lab Results   Component Value Date    WBC 6.6 09/04/2022    HGB 14.1 09/04/2022    HCT 42.5 09/04/2022    MCV 99 09/04/2022     09/04/2022     Lab Results   Component Value Date     09/04/2022    CO2 21 (L) 09/04/2022     Tammie Pop PA-C on 9/7/2022 at 11:49 AM    "

## 2022-09-07 NOTE — PROGRESS NOTES
Care Management Follow Up    Length of Stay (days): 1    Expected Discharge Date: 09/07/2022     Concerns to be Addressed: coping/stress, discharge planning, home safety  Lives alone  Patient plan of care discussed at interdisciplinary rounds: Yes    Anticipated Discharge Disposition: Skilled Nursing Facility, Transitional Care     Anticipated Discharge Services: Other (see comment) (Pending clinical progress)  Anticipated Discharge DME: Other (see comment) (Pending clinical progress)    Patient/family educated on Medicare website which has current facility and service quality ratings: no  Education Provided on the Discharge Plan:    Patient/Family in Agreement with the Plan: yes    Referrals Placed by CM/SW:  TCU   Private pay costs discussed: Not applicable    Additional Information:  BILLY met with Pt and is agreeable to [facility]. BILLY talked with Yenny in admissions and put in for [employer] Auth.  If auth received this am, Pt could discharge this afternoon.  Friend will transport.     2:12 PM  BILLY met with Pt and is anxious about arranging her friends to help with packing and transport.  Pt is going to have a friend pack up her belongings from home. TAMICA called Yenny at [facility] and she called [employer] and states no decision has been made as of yet.     3:41 PM  PAS completed    JORGITO Crawford

## 2022-09-08 ENCOUNTER — APPOINTMENT (OUTPATIENT)
Dept: PHYSICAL THERAPY | Facility: CLINIC | Age: 87
DRG: 543 | End: 2022-09-08
Payer: COMMERCIAL

## 2022-09-08 VITALS
TEMPERATURE: 98 F | DIASTOLIC BLOOD PRESSURE: 83 MMHG | HEART RATE: 63 BPM | OXYGEN SATURATION: 96 % | RESPIRATION RATE: 17 BRPM | SYSTOLIC BLOOD PRESSURE: 107 MMHG | WEIGHT: 118.4 LBS | HEIGHT: 65 IN | BODY MASS INDEX: 19.73 KG/M2

## 2022-09-08 LAB
INR PPP: 3.21 (ref 0.85–1.15)
MAGNESIUM SERPL-MCNC: 1.6 MG/DL (ref 1.8–2.6)
POTASSIUM BLD-SCNC: 2.9 MMOL/L (ref 3.5–5)

## 2022-09-08 PROCEDURE — 83735 ASSAY OF MAGNESIUM: CPT | Performed by: FAMILY MEDICINE

## 2022-09-08 PROCEDURE — 85610 PROTHROMBIN TIME: CPT | Performed by: FAMILY MEDICINE

## 2022-09-08 PROCEDURE — 99239 HOSP IP/OBS DSCHRG MGMT >30: CPT | Performed by: FAMILY MEDICINE

## 2022-09-08 PROCEDURE — 250N000013 HC RX MED GY IP 250 OP 250 PS 637: Performed by: HOSPITALIST

## 2022-09-08 PROCEDURE — 250N000013 HC RX MED GY IP 250 OP 250 PS 637: Performed by: FAMILY MEDICINE

## 2022-09-08 PROCEDURE — 84132 ASSAY OF SERUM POTASSIUM: CPT | Performed by: FAMILY MEDICINE

## 2022-09-08 PROCEDURE — 36415 COLL VENOUS BLD VENIPUNCTURE: CPT | Performed by: FAMILY MEDICINE

## 2022-09-08 PROCEDURE — 97116 GAIT TRAINING THERAPY: CPT | Mod: GP

## 2022-09-08 RX ORDER — WARFARIN SODIUM 2 MG/1
3 TABLET ORAL DAILY
Qty: 45 TABLET | Refills: 0 | Status: SHIPPED | OUTPATIENT
Start: 2022-09-08 | End: 2022-09-08

## 2022-09-08 RX ORDER — POTASSIUM CHLORIDE 1500 MG/1
20 TABLET, EXTENDED RELEASE ORAL ONCE
Status: COMPLETED | OUTPATIENT
Start: 2022-09-08 | End: 2022-09-08

## 2022-09-08 RX ORDER — WARFARIN SODIUM 2 MG/1
3 TABLET ORAL DAILY
Qty: 45 TABLET | Refills: 0 | Status: SHIPPED | OUTPATIENT
Start: 2022-09-09 | End: 2022-09-12 | Stop reason: DRUGHIGH

## 2022-09-08 RX ORDER — OXYCODONE HYDROCHLORIDE 5 MG/1
2.5-5 TABLET ORAL
Qty: 30 TABLET | Refills: 0 | Status: SHIPPED | OUTPATIENT
Start: 2022-09-08

## 2022-09-08 RX ORDER — POTASSIUM CHLORIDE 1500 MG/1
40 TABLET, EXTENDED RELEASE ORAL ONCE
Status: COMPLETED | OUTPATIENT
Start: 2022-09-08 | End: 2022-09-08

## 2022-09-08 RX ORDER — AMOXICILLIN 250 MG
1 CAPSULE ORAL 2 TIMES DAILY
Qty: 30 TABLET | Refills: 0 | Status: SHIPPED | OUTPATIENT
Start: 2022-09-08

## 2022-09-08 RX ADMIN — POTASSIUM CHLORIDE 40 MEQ: 1500 TABLET, EXTENDED RELEASE ORAL at 09:03

## 2022-09-08 RX ADMIN — OXYCODONE HYDROCHLORIDE 5 MG: 5 TABLET ORAL at 09:25

## 2022-09-08 RX ADMIN — FUROSEMIDE 40 MG: 40 TABLET ORAL at 09:04

## 2022-09-08 RX ADMIN — POTASSIUM CHLORIDE 20 MEQ: 1500 TABLET, EXTENDED RELEASE ORAL at 12:11

## 2022-09-08 RX ADMIN — LISINOPRIL 20 MG: 10 TABLET ORAL at 09:04

## 2022-09-08 RX ADMIN — ASPIRIN 81 MG: 81 TABLET, COATED ORAL at 09:00

## 2022-09-08 RX ADMIN — PANTOPRAZOLE SODIUM 40 MG: 20 TABLET, DELAYED RELEASE ORAL at 05:55

## 2022-09-08 RX ADMIN — Medication 1 TABLET: at 09:04

## 2022-09-08 RX ADMIN — METOPROLOL SUCCINATE 100 MG: 100 TABLET, EXTENDED RELEASE ORAL at 09:00

## 2022-09-08 RX ADMIN — SERTRALINE HYDROCHLORIDE 25 MG: 25 TABLET, FILM COATED ORAL at 09:03

## 2022-09-08 RX ADMIN — DOCUSATE SODIUM AND SENNOSIDES 2 TABLET: 50; 8.6 TABLET ORAL at 09:01

## 2022-09-08 ASSESSMENT — ACTIVITIES OF DAILY LIVING (ADL)
DIFFICULTY_EATING/SWALLOWING: NO
ADLS_ACUITY_SCORE: 35
CHANGE_IN_FUNCTIONAL_STATUS_SINCE_ONSET_OF_CURRENT_ILLNESS/INJURY: YES
DRESSING/BATHING: BATHING DIFFICULTY, ASSISTANCE 1 PERSON
TRANSFERRING: 0-->ASSISTANCE NEEDED (DEVELOPMENTALLY APPROPRIATE)
ADLS_ACUITY_SCORE: 36
DOING_ERRANDS_INDEPENDENTLY_DIFFICULTY: YES
WEAR_GLASSES_OR_BLIND: YES
VISION_MANAGEMENT: GLASSES
DRESS: 1-->ASSISTANCE (EQUIPMENT/PERSON) NEEDED
WALKING_OR_CLIMBING_STAIRS_DIFFICULTY: YES
ADLS_ACUITY_SCORE: 36
ADLS_ACUITY_SCORE: 33
TOILETING_ISSUES: NO
CONCENTRATING,_REMEMBERING_OR_MAKING_DECISIONS_DIFFICULTY: NO
DRESS: 0-->ASSISTANCE NEEDED (DEVELOPMENTALLY APPROPRIATE)
EQUIPMENT_CURRENTLY_USED_AT_HOME: CANE, STRAIGHT
ADLS_ACUITY_SCORE: 36
BATHING: 1-->ASSISTANCE NEEDED
DRESSING/BATHING_MANAGEMENT: NEEDS ASSISTANCE
DIFFICULTY_COMMUNICATING: NO
ADLS_ACUITY_SCORE: 36
ADLS_ACUITY_SCORE: 35
WALKING_OR_CLIMBING_STAIRS: AMBULATION DIFFICULTY, REQUIRES EQUIPMENT;STAIR CLIMBING DIFFICULTY, REQUIRES EQUIPMENT;STAIR CLIMBING DIFFICULTY, ASSISTANCE 1 PERSON;TRANSFERRING DIFFICULTY, REQUIRES EQUIPMENT
TRANSFERRING: 1-->ASSISTANCE (EQUIPMENT/PERSON) NEEDED
DRESSING/BATHING_DIFFICULTY: YES
FALL_HISTORY_WITHIN_LAST_SIX_MONTHS: NO

## 2022-09-08 NOTE — PROGRESS NOTES
Physical Therapy Discharge Summary    Reason for therapy discharge:    Discharged to transitional care facility.    Progress towards therapy goal(s). See goals on Care Plan in [facility] electronic health record for goal details.  Goals partially met.  Barriers to achieving goals:   limited tolerance for therapy.    Therapy recommendation(s):    Continued therapy is recommended.  Rationale/Recommendations:  Patient is not at functional mobility baseline.

## 2022-09-08 NOTE — PLAN OF CARE
Occupational Therapy Discharge Summary    Reason for therapy discharge:    Discharged to transitional care facility.    Progress towards therapy goal(s). See goals on Care Plan [facility] health record for goal details.  Goals not met.  Barriers to achieving goals:   discharge from facility.    Therapy recommendation(s):    Continued therapy is recommended.  Rationale/Recommendations:  Recommending continued therapy at TCU to return to PLOF.

## 2022-09-08 NOTE — PROGRESS NOTES
Care Management Follow Up    Length of Stay (days): 5    Expected Discharge Date: 09/08/2022     Concerns to be Addressed: coping/stress, discharge planning, home safety  Lives alone  Patient plan of care discussed at interdisciplinary rounds: Yes    Anticipated Discharge Disposition: Skilled Nursing Facility, Transitional Care     Anticipated Discharge Services: Other (see comment) (Pending clinical progress)  Anticipated Discharge DME: Other (see comment) (Pending clinical progress)    Patient/family educated on Medicare website which has current facility and service quality ratings: no  Education Provided on the Discharge Plan:  Yes   Patient/Family in Agreement with the Plan: yes    Referrals Placed by CM/SW:  TCU   Private pay costs discussed: transportation costs    Care Management Discharge Note    Discharge Date: 09/08/2022       Discharge Disposition: Skilled Nursing Facility    Discharge Services: None    Discharge DME: None    Discharge Transportation: family or friend will provide    Private pay costs discussed: Not applicable    PAS Confirmation Code:  ([id number])  Patient/family educated on Medicare website which has current facility and service quality ratings: yes    Education Provided on the Discharge Plan:  Yes   Persons Notified of Discharge Plans: Pt and TCU   Patient/Family in Agreement with the Plan: yes    Handoff Referral Completed: Yes        Additional Information:  Yenny called from [facility] and [employer] is requesting a Peer to Peer by 11:00AM today [phone] with Option # 5.    11:08 AM  Hospitalist completed Peer to Peer and Pt did received authorization.  Pt can admit today.  Pt is calling friends to see if they can transport at 1:00 PM.  PAS completed.     1:34 PM  Pt is discharging today to [facility] today and Pt has friend transporting at 1:30 PM.     JORGITO Crawford

## 2022-09-08 NOTE — PLAN OF CARE
Problem: Plan of Care - These are the overarching goals to be used throughout the patient stay.    Goal: Plan of Care Review/Shift Note  Description: The Plan of Care Review/Shift note should be completed every shift.  The Outcome Evaluation is a brief statement about your assessment that the patient is improving, declining, or no change.  This information will be displayed automatically on your shift note.  Outcome: Adequate for Care Transition     Problem: Pain Acute  Goal: Acceptable Pain Control and Functional Ability  Outcome: Adequate for Care Transition  Intervention: Develop Pain Management Plan  Recent Flowsheet Documentation  Taken 9/8/2022 0904 by Lexis Gutierrez RN  Pain Management Interventions: medication (see MAR)  Intervention: Prevent or Manage Pain  Recent Flowsheet Documentation  Taken 9/8/2022 0904 by Lexis Gutierrez RN  Medication Review/Management: medications reviewed   Goal Outcome Evaluation:      Patient complained of pain this morning 8/10 and improved with oral pain medication. Patient was pleasant and agreeable to TCU transfer. Copy of AVS was discussed and given to the patient before discharge. All questions were answered. Patient was then discharged with friend.

## 2022-09-08 NOTE — PROGRESS NOTES
"Orthopedic Progress Note      Assessment:    Non displaced sacral alar fractures, atraumatic, neurologically intact    Plan:   - Continue PT/OT  - Weightbearing status: WBAT with assistive device  - Anticoagulation: per hospitalist, on Coumadin  - Discharge planning: Pending TCU placement. Ortho discharge orders placed. Ortho will sign off, unless called. Please call if she develops any neurologic deficits, numbness, tingling or bowel or bladder deficits.    Subjective:  Pain: reports pain with movement but at rest has no pain  Chest pain, SOB: none  Nausea, Vomiting:  No  Lightheadedness, Dizziness:  No  Neuro:  Patient denies new onset numbness or paresthesias    Pain controlled this am. Anxious about discharging. Otherwise, no new concerns.     Objective:  BP (!) 147/96 (BP Location: Left arm)   Pulse 60   Temp 98.1  F (36.7  C) (Oral)   Resp 17   Ht 1.651 m (5' 5\")   Wt 53.7 kg (118 lb 6.4 oz)   SpO2 96%   BMI 19.70 kg/m    The patient is A&Ox3. Appears comfortable.   Sensation is intact to light touch in bilateral lower extremities.  Dorsiflexion and plantar flexion is intact bilaterally.  Dorsalis pedis pulse intact bilaterally.  Calves are soft and non-tender bilaterally. Negative Shaq's.    Pertinent Labs   Lab Results: personally reviewed.   Lab Results   Component Value Date    INR 3.21 (H) 09/08/2022    INR 3.13 (H) 09/07/2022    INR 3.03 (H) 09/06/2022     Lab Results   Component Value Date    WBC 6.6 09/04/2022    HGB 14.1 09/04/2022    HCT 42.5 09/04/2022    MCV 99 09/04/2022     09/04/2022     Lab Results   Component Value Date     09/04/2022    CO2 21 (L) 09/04/2022       [facility] Dr. Solano, who agrees with this plan.      Tammie Pop PA-C on 9/8/2022 at 9:59 AM        "

## 2022-09-08 NOTE — DISCHARGE INSTRUCTIONS
Alexis Mays MD    Consulting Physician    Since 9/3/2022    [phone]   To be seen in two weeks

## 2022-09-08 NOTE — PLAN OF CARE
Goal Outcome Evaluation:    Plan of Care Reviewed With: patient     Overall Patient Progress: improving    Outcome Evaluation: Awaiting TCU placement.    Patient vital signs are at baseline: Yes  Patient able to ambulate as they were prior to admission or with assist devices provided by therapies during their stay:  Yes  Patient MUST void prior to discharge:  Yes  Patient able to tolerate oral intake:  Yes  Pain has adequate pain control using Oral analgesics:  Yes  Does patient have an identified :  Yes  Has goal D/C date and time been discussed with patient:  Yes    Awaiting insurance authorization for TCU placement.  Patient denied pain overnight. Up with assist of 1 to bathroom. Vitals stable.

## 2022-09-09 ENCOUNTER — TELEPHONE (OUTPATIENT)
Dept: GERIATRICS | Facility: CLINIC | Age: 87
End: 2022-09-09

## 2022-09-09 ENCOUNTER — PATIENT OUTREACH (OUTPATIENT)
Dept: CARE COORDINATION | Facility: CLINIC | Age: 87
End: 2022-09-09

## 2022-09-09 NOTE — DISCHARGE SUMMARY
[facility]  Hospitalist Discharge Summary      Date of Admission:  9/3/2022  Date of Discharge:  9/8/2022  1:57 PM  Discharging Provider: Franklin Lee MD  Discharge Service: Hospitalist Service    Discharge Diagnoses   Bilateral Sacral Alar Fractures    Follow-ups Needed After Discharge   Follow-up Appointments     Follow Up Care      Please follow-up with Dr. Solano's team in 2 weeks at [facility]. Call our scheduling line at [phone] to make an   appointment, if you do not already have one scheduled.         Follow Up and recommended labs and tests      Follow up with senior care physician.  The following labs/tests are   recommended: Check INR in 2 days and titrate coumadin accordingly.              Unresulted Labs Ordered in the Past 30 Days of this Admission     No orders found from 8/4/2022 to 9/4/2022.      These results will be followed up by n/a.    Discharge Disposition   Discharged to short-term care facility  Condition at discharge: Stable      Hospital Course   Jori Humphreys is a 86 year old female with a past medical history of cardiomyopathy with chronic systolic congestive heart failure, COPD, atrial fibrillation with chronic anticoagulation, and hypertension presenting complaining of low back and buttock pain found to have sacral alar fractures.  No acute issues today awaiting TCU placement and is anxious because of the extra day it has taken to get approval from her insurance.     Assessment and Plan:  Bilateral sacral alar fractures/osteoporosis  Orthopedic consultation appreciated  Pain control  Pathologic fractures by definition as no fall  No obvious signs of bone abnormalities other than osteoporosis  PT and OT at TCU     Hypertension  Home meds     Chronic systolic heart failure  EF of 46% on last echo  No signs of failure  Low-sodium diet  Monitor volume status  Euvolemic at this time     Abnormal retroperitoneal fat stranding on CT  Lipase  and UA I unremarkable  No abdominal issues     Coronary disease  Expectant management     Remote history of colon cancer     COPD - stable     Essential tremor     Atrial fibrillation with chronic anticoagulation/supratherapeutic INR  Hold warfarin today  Start at 3 mg tomorrow due to elevated INR today.    Consultations This Hospital Stay   PHARMACY TO DOSE WARFARIN  ORTHOPEDIC SURGERY IP CONSULT  CARE MANAGEMENT / SOCIAL WORK IP CONSULT  SOCIAL WORK IP CONSULT  PHYSICAL THERAPY ADULT IP CONSULT  OCCUPATIONAL THERAPY ADULT IP CONSULT  ACUPUNCTURE IP CONSULT  PHYSICAL THERAPY ADULT IP CONSULT  OCCUPATIONAL THERAPY ADULT IP CONSULT    Code Status   Prior    Time Spent on this Encounter   I, Franklin Lee MD, personally saw the patient today and spent greater than 30 minutes discharging this patient.       Franklin Lee MD  [address] [address]  Phone: [phone]  Fax: [phone]  ______________________________________________________________________    Physical Exam   Vital Signs: Temp: 98  F (36.7  C) Temp src: Oral BP: 107/83 Pulse: 63   Resp: 17 SpO2: 96 % O2 Device: None (Room air)    Weight: 118 lbs 6.4 oz  Constitutional: awake, alert, cooperative, no apparent distress, and appears stated age  ENT: normocepalic, without obvious abnormality, atramatic  Respiratory: clear to auscultation  Cardiovascular: normal S1 and S2  GI: normal bowel sounds  Skin: no rashes  Musculoskeletal: no lower extremity pitting edema present  Neurologic: Moves all extremities, Cranial nerves intact.       Primary Care Physician   Tawana Archer    Discharge Orders      Follow Up (TCM)    Follow up with [facility] (Dr. Alexis Mays) 4 weeks from hospital stay, call [phone] for appointment.     Follow Up Care    Please follow-up with Dr. Mays's team in 2 weeks at [facility]. Call our scheduling line at [phone] to  make an appointment, if you do not already have one scheduled.     Return to Driving    Return to driving - Driving is NOT permitted until directed by your provider. Under no circumstance are you permitted to drive while using narcotic pain medications.     Weight bearing as tolerated    Weight bearing as tolerated with walker.     Activity     General info for SNF    Length of Stay Estimate: Short Term Care: Estimated # of Days <30  Condition at Discharge: Improving  Level of care:skilled   Rehabilitation Potential: Good  Admission H&P remains valid and up-to-date: Yes  Recent Chemotherapy: N/A  Use Nursing Home Standing Orders: Yes     Mantoux instructions    Give two-step Mantoux (PPD) Per Facility Policy Yes     Follow Up and recommended labs and tests    Follow up with intermediate physician.  The following labs/tests are recommended: Check INR in 2 days and titrate coumadin accordingly.     Reason for your hospital stay    Bilateral Sacral Alar Fractures.     Activity - Up with nursing assistance     Physical Therapy Adult Consult    Evaluate and treat as clinically indicated.    Reason:  Gait improvement     Occupational Therapy Adult Consult    Evaluate and treat as clinically indicated.    Reason:  Gait training     Fall precautions     Diet    Follow this diet upon discharge: Orders Placed This Encounter      Combination Diet Regular Diet Adult; 2 gm NA Diet       Significant Results and Procedures   Results for orders placed or performed during the hospital encounter of 09/03/22   CT Pelvis Bone w Contrast    Narrative    EXAM: CT PELVIS BONE W CONTRAST  LOCATION: [facility]  DATE/TIME: 9/3/2022 7:32 PM    INDICATION: bilac si pain with pelvic compression  COMPARISON: 09/03/2022 CT abdomen pelvis. 09/01/2022 CT lumbar spine. 10/08/2021 CT abdomen pelvis.  TECHNIQUE: CT scan of the pelvis was performed with IV contrast. Multiplanar reformats were obtained. Dose reduction  techniques were used.  CONTRAST: 100ml Isovue 370    FINDINGS:    BONE: Acute nondisplaced bilateral sacral ala fractures. Acute fracture through the superior aspect of the S2 segment. No pubic rami fractures are evident. No evidence for avascular necrosis. No osseous lesions.    JOINTS: Degenerative changes in both hips with degenerative capsular calcinosis. Reactive subcortical cystic change in the left femoral head neck junction. Mild degenerative changes in the SI joints.    MUSCLES/TENDONS: No intramuscular hematoma. No retracted tendon tear.    SOFT TISSUES: No subcutaneous hematoma or fluid collection. Small amount of free fluid in the pelvis. Aortobiiliac graft. 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 Right renal cortical cyst.      Impression    IMPRESSION:  1.  Acute nondisplaced bilateral sacral alar fractures and the S2 segment.  2.  No pubic rami fractures are evident.  3.  Degenerative changes in both hips and SI joints.  4.  Small amount of free fluid in the pelvis.     CTA Abdomen Pelvis with Contrast    Narrative    EXAM: CTA ABDOMEN PELVIS WITH CONTRAST  LOCATION: [facility]  DATE/TIME: 9/3/2022 7:29 PM    INDICATION: back pain, radiates to buttocks, AAA history, 3rd visit for back pain  COMPARISON: CTA 11/10/2021  TECHNIQUE: CT angiogram abdomen pelvis during arterial phase of injection of IV contrast. 2D and 3D MIP reconstructions were performed by the CT technologist. Dose reduction techniques were used.  CONTRAST: 100ml Isovue 370    FINDINGS:  ANGIOGRAM ABDOMEN/PELVIS: Prior aortobiiliac stent graft placement. Decreased size of excluded aneurysm sac, currently measuring 4.5 x 3.9 cm, previously 5.2 x 4.5 cm (series 5 image 193). No evidence of endoleak on this single phase exam. There is   extensive calcified and noncalcified atherosclerosis seen in the proximal thoracic and upper abdominal aorta without definite dissection or intramural hematoma. There  is narrowing at the origin of the celiac axis, patent distally. Superior mesenteric   artery is widely patent. 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 Visualized portions of the common and superficial femoral arteries are patent. There is some subtle upper abdominal retroperitoneal fat   stranding without definitely visualized source.    LOWER CHEST: Markedly enlarged heart, particularly the right atrium and right ventricle. Pacemaker leads partially visualized.    HEPATOBILIARY: Significant distention of the inferior vena cava and hepatic veins with reflux of contrast. No focal suspicious liver lesion.    PANCREAS: There is some subtle surrounding fat stranding. Questionable focus of ductal dilation in the pancreatic tail (series 5 image 126). No definite mass visualized.    SPLEEN: Normal.    ADRENAL GLANDS: Bilateral adrenal hyperplasia, increased since prior examination, without distinct nodule.    KIDNEYS/BLADDER: No hydronephrosis. Urinary bladder is unremarkable.    BOWEL: Prior partial sigmoid colectomy.    LYMPH NODES: No suspicious lymphadenopathy.    PELVIC ORGANS: Trace free fluid in the pelvis. Otherwise unremarkable.    MUSCULOSKELETAL: No acute bony abnormality.      Impression    IMPRESSION:  1.  Stable appearance of aortobiiliac stent graft with decreased size of excluded aneurysm sac.  2.  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 Distal common femoral artery is patent.  3.  Subtle upper abdominal retroperitoneal fat stranding without definite source visualized. Recommend correlation with urinalysis and lipase.  4.  Possible focal pancreatic ductal dilation in the tail, not definitively seen on prior examination. Consider follow-up pancreas protocol CT on a nonemergent basis for further evaluation.  5.  Findings of congestive heart failure with significant dilation of IVC and hepatic veins.   Head CT w/o contrast    Narrative    EXAM: CT  HEAD W/O CONTRAST  LOCATION: [facility]  DATE/TIME: 9/3/2022 9:36 PM    INDICATION: Trauma  COMPARISON: MRI brain 11/10/2018.  TECHNIQUE: Routine CT Head without IV contrast. Multiplanar reformats. Dose reduction techniques were used.    FINDINGS:  INTRACRANIAL CONTENTS: Intravenous contrast from the earlier same day contrast-enhanced examinations. No intracranial hemorrhage, extraaxial collection, or mass effect.  No CT evidence of acute infarct. Moderate presumed chronic small vessel ischemic   changes. Moderate generalized volume loss. No hydrocephalus.     VISUALIZED ORBITS/SINUSES/MASTOIDS: No intraorbital abnormality. No significant paranasal sinus mucosal disease. No middle ear or mastoid effusion.    BONES/SOFT TISSUES: No acute abnormality. No acute calvarial fracture on the 3-D reconstruction.      Impression    IMPRESSION:  1.  No acute intracranial process.       Discharge Medications   Discharge Medication List as of 9/8/2022  1:34 PM      START taking these medications    Details   senna-docusate (SENOKOT-S/PERICOLACE) 8.6-50 MG tablet Take 1 tablet by mouth 2 times daily, Disp-30 tablet, R-0, Local Print         CONTINUE these medications which have CHANGED    Details   oxyCODONE (ROXICODONE) 5 MG tablet Take 0.5-1 tablets (2.5-5 mg) by mouth every 3 hours as needed for severe pain or moderate to severe pain, Disp-30 tablet, R-0, Local Print      warfarin ANTICOAGULANT (COUMADIN) 2 MG tablet Take 1.5 tablets (3 mg) by mouth daily, Disp-45 tablet, R-0, E-Prescribe         CONTINUE these medications which have NOT CHANGED    Details   acetaminophen (TYLENOL) 325 MG tablet Take 650 mg by mouth every 6 hours as needed for mild pain, Historical      alendronate (FOSAMAX) 70 MG tablet Take 70 mg by mouth every 7 days. Take in the morning on an empty stomach with a full glass of water 30 minutes before food, Historical      aspirin 81 MG EC tablet Take 81 mg by mouth daily,  Historical      budesonide-formoterol (SYMBICORT) 80-4.5 MCG/ACT Inhaler Inhale 2 puffs into the lungs 2 times daily as needed for shortness of breath / dyspnea , Historical      calcium carbonate-vitamin D (OSCAL W/D) 500-200 MG-UNIT tablet Take 1 tablet by mouth 2 times daily, Historical      furosemide (LASIX) 20 MG tablet Take 40 mg by mouth daily, Historical      ibuprofen (ADVIL/MOTRIN) 200 MG tablet Take 400 mg by mouth every 6 hours as needed for mild pain, Historical      lidocaine (LIDODERM) 5 % patch Place 1 patch onto the skin every 24 hours To prevent lidocaine toxicity, patient should be patch free for 12 hrs daily.Historical      lisinopril (ZESTRIL) 20 MG tablet Take 20 mg by mouth every morning, Historical      metoprolol succinate ER (TOPROL XL) 100 MG 24 hr tablet Take 100 mg by mouth every morning, Historical      Multiple Vitamins-Minerals (PRESERVISION AREDS PO) Take 1 tablet by mouth 2 times daily , Historical      omeprazole (PRILOSEC) 20 MG DR capsule Take 20 mg by mouth daily, Historical      Polyvinyl Alcohol-Povidone (REFRESH OP) Apply 1 drop to eye as needed., Historical      sertraline (ZOLOFT) 25 MG tablet Take 25 mg by mouth daily , Historical           Allergies   Allergies   Allergen Reactions     Alendronic Acid      Other reaction(s): 6/08, stomach bothered so quit early June     Codeine Nausea     Risedronate      Other reaction(s): 8/3/09 can't tolerate,  stomach upset

## 2022-09-09 NOTE — TELEPHONE ENCOUNTER
[facility] Triage Nurse INR     Provider: KARAN Dee CNP   Facility: [facility]   Facility Type:  TCU    Caller: Yaron  Call Back Number: [phone]  Reason for call: INR  Diagnosis/Goal: A. Fib    Todays INR: not done - order from the hospital orders next INR on 9/10 (weekend)  Last INR:     9/8 - 3.21     9/7 - 3.13     Current dose: 3 mg daily    Heparin/Lovenox:  No  Currently on ABX?: No  Other interacting medication:  None  Missed or refused doses: No    Verbal Order/Direction given by Provider: Coumadin 3 mg PO daily. Check INR on 9/12/22.    Provider Giving Order:  KARAN Dee CNP     Verbal Order given to: Rashi Lima RN

## 2022-09-09 NOTE — PROGRESS NOTES
[facility]    Background: Transitional Care Management program auto-identified and prompting a chart review by [facility] team.    Assessment: Upon chart review, [facility] Team member will cancel/close this episode of Transitional Care Management program due to reason below:    Patient has discharged to a Memory Care, Nursing Home, Assisted Living or Group Home where patient is receiving on-site support with their daily cares, including support with hospital follow up plan.    Plan: Transitional Care Management episode closed per reason above.      Lissa Garcia MA  [facility]    *Connected Care Resource Team does NOT follow patient ongoing. Referrals are identified based on internal discharge reports and the outreach is to ensure patient has an understanding of their discharge instructions.

## 2022-09-12 ENCOUNTER — TRANSITIONAL CARE UNIT VISIT (OUTPATIENT)
Dept: GERIATRICS | Facility: CLINIC | Age: 87
End: 2022-09-12
Payer: COMMERCIAL

## 2022-09-12 ENCOUNTER — TELEPHONE (OUTPATIENT)
Dept: GERIATRICS | Facility: CLINIC | Age: 87
End: 2022-09-12

## 2022-09-12 ENCOUNTER — ANTICOAGULATION THERAPY VISIT (OUTPATIENT)
Dept: ANTICOAGULATION | Facility: CLINIC | Age: 87
End: 2022-09-12

## 2022-09-12 VITALS
WEIGHT: 124 LBS | OXYGEN SATURATION: 95 % | HEART RATE: 67 BPM | RESPIRATION RATE: 18 BRPM | DIASTOLIC BLOOD PRESSURE: 92 MMHG | BODY MASS INDEX: 20.66 KG/M2 | HEIGHT: 65 IN | TEMPERATURE: 98.3 F | SYSTOLIC BLOOD PRESSURE: 134 MMHG

## 2022-09-12 DIAGNOSIS — I48.91 ATRIAL FIBRILLATION, UNSPECIFIED TYPE (H): ICD-10-CM

## 2022-09-12 DIAGNOSIS — M80.00XS AGE-RELATED OSTEOPOROSIS WITH CURRENT PATHOLOGICAL FRACTURE, SEQUELA: ICD-10-CM

## 2022-09-12 DIAGNOSIS — J44.9 CHRONIC OBSTRUCTIVE PULMONARY DISEASE, UNSPECIFIED COPD TYPE (H): ICD-10-CM

## 2022-09-12 DIAGNOSIS — I50.22 CHRONIC SYSTOLIC HEART FAILURE (H): ICD-10-CM

## 2022-09-12 DIAGNOSIS — I48.91 ATRIAL FIBRILLATION, UNSPECIFIED TYPE (H): Primary | ICD-10-CM

## 2022-09-12 DIAGNOSIS — I10 ESSENTIAL HYPERTENSION: ICD-10-CM

## 2022-09-12 DIAGNOSIS — S32.10XS CLOSED FRACTURE OF SACRUM, UNSPECIFIED PORTION OF SACRUM, SEQUELA: Primary | ICD-10-CM

## 2022-09-12 DIAGNOSIS — Z79.01 CHRONIC ANTICOAGULATION: ICD-10-CM

## 2022-09-12 DIAGNOSIS — I25.10 CORONARY ARTERY DISEASE INVOLVING NATIVE CORONARY ARTERY OF NATIVE HEART WITHOUT ANGINA PECTORIS: ICD-10-CM

## 2022-09-12 PROBLEM — I71.40 ABDOMINAL AORTIC ANEURYSM (AAA) WITHOUT RUPTURE (H): Status: RESOLVED | Noted: 2021-06-15 | Resolved: 2022-09-12

## 2022-09-12 PROBLEM — F33.9 EPISODE OF RECURRENT MAJOR DEPRESSIVE DISORDER (H): Status: RESOLVED | Noted: 2021-06-15 | Resolved: 2022-09-12

## 2022-09-12 PROBLEM — D69.6 THROMBOCYTOPENIA (H): Status: RESOLVED | Noted: 2021-10-09 | Resolved: 2022-09-12

## 2022-09-12 PROBLEM — F33.42 RECURRENT MAJOR DEPRESSION IN FULL REMISSION (H): Status: RESOLVED | Noted: 2021-06-15 | Resolved: 2022-09-12

## 2022-09-12 LAB — INR (EXTERNAL): 1.8 (ref 0.9–1.1)

## 2022-09-12 PROCEDURE — 99309 SBSQ NF CARE MODERATE MDM 30: CPT | Performed by: NURSE PRACTITIONER

## 2022-09-12 NOTE — LETTER
9/12/2022        RE: Jori Humphreys  [address]  [place] [address]        [facility]    PRIMARY CARE PROVIDER AND CLINIC:  Tawana Archer MD, [facility] [address]  / [place] [address]  Chief Complaint   Patient presents with     [facility] Medical Record Number:  [id number]  Place of Service where encounter took place:  [facility]) [[id number]]    Jori Humphreys  is a 86 year old  (1935), admitted to the above facility from  [facility]. Hospital stay 9/3/22 through 9/8/22.. Patient with PMH cardiomyopathy, CHF, COPD, afib, and HTN presented with persistent low back and buttock pain. She was found to have sacral alar fractures. She did not have a fall, but has been busy packing and moving things to sell her house.     HPI:    Patient reports ongoing pain, mostly with movement. No other physical complaints. She has been feeling more depressed recently, but does not want any more medication. She would prefer to have less medication.   -140s/80-90s  HR 60s    CODE STATUS/ADVANCE DIRECTIVES DISCUSSION:  DNR / DNI  ALLERGIES:   Allergies   Allergen Reactions     Alendronic Acid      Other reaction(s): 6/08, stomach bothered so quit early June     Codeine Nausea     Risedronate      Other reaction(s): 8/3/09 can't tolerate,  stomach upset      PAST MEDICAL HISTORY:   Past Medical History:   Diagnosis Date     COPD (chronic obstructive pulmonary disease) (H)       PAST SURGICAL HISTORY:   has a past surgical history that includes knee surgery (Left, 1997); Arthroscopy shoulder rotator cuff repair; Cataract Extraction (Bilateral); Release carpal tunnel (2011); Release trigger finger; other surgical history (2008); Pr Temporal Artery Ligatn Or Bx (Bilateral, 12/10/2014); Pr Shldr Arthroscop,Surg,W/Rotat Cuff Repr (Left, 8/24/2018); Pr Incise Finger Tendon Sheath (Left, 8/24/2018); and REVISE MEDIAN  N/CARPAL TUNNEL SURG (Left, 11/8/2019).  FAMILY HISTORY: family history is not on file.  SOCIAL HISTORY:   reports that she quit smoking about 5 years ago. Her smoking use included cigarettes. She has a 30.00 pack-year smoking history. She has never used smokeless tobacco. She reports current alcohol use of about 4.2 standard drinks of alcohol per week.  Patient's living condition: lives alone    Post Discharge Medication Reconciliation Status:   Post Medication Reconciliation Status: Discharge medications reconciled, continue medications without change         Current Outpatient Medications   Medication Sig     acetaminophen (TYLENOL) 325 MG tablet Take 650 mg by mouth every 6 hours as needed for mild pain     alendronate (FOSAMAX) 70 MG tablet Take 70 mg by mouth every 7 days. Take in the morning on an empty stomach with a full glass of water 30 minutes before food     aspirin 81 MG EC tablet Take 81 mg by mouth daily     budesonide-formoterol (SYMBICORT) 80-4.5 MCG/ACT Inhaler Inhale 2 puffs into the lungs 2 times daily as needed for shortness of breath / dyspnea      calcium carbonate-vitamin D (OSCAL W/D) 500-200 MG-UNIT tablet Take 1 tablet by mouth 2 times daily     furosemide (LASIX) 20 MG tablet Take 40 mg by mouth daily     ibuprofen (ADVIL/MOTRIN) 200 MG tablet Take 400 mg by mouth every 6 hours as needed for mild pain     lidocaine (LIDODERM) 5 % patch Place 1 patch onto the skin every 24 hours To prevent lidocaine toxicity, patient should be patch free for 12 hrs daily.     lisinopril (ZESTRIL) 20 MG tablet Take 20 mg by mouth every morning     metoprolol succinate ER (TOPROL XL) 100 MG 24 hr tablet Take 100 mg by mouth every morning     Multiple Vitamins-Minerals (PRESERVISION AREDS PO) Take 1 tablet by mouth 2 times daily      omeprazole (PRILOSEC) 20 MG DR capsule Take 20 mg by mouth daily     oxyCODONE (ROXICODONE) 5 MG tablet Take 0.5-1 tablets (2.5-5 mg) by mouth every 3 hours as needed for severe  "pain or moderate to severe pain     Polyvinyl Alcohol-Povidone (REFRESH OP) Apply 1 drop to eye as needed.     senna-docusate (SENOKOT-S/PERICOLACE) 8.6-50 MG tablet Take 1 tablet by mouth 2 times daily     sertraline (ZOLOFT) 25 MG tablet Take 25 mg by mouth daily      Warfarin Sodium (COUMADIN PO) Per INR orders     No current facility-administered medications for this visit.       ROS:  10 point ROS of systems including Constitutional, Eyes, Respiratory, Cardiovascular, Gastroenterology, Genitourinary, Integumentary, Musculoskeletal, Psychiatric were all negative except for pertinent positives noted in my HPI.    Vitals:  BP (!) 134/92   Pulse 67   Temp 98.3  F (36.8  C)   Resp 18   Ht 1.651 m (5' 5\")   Wt 56.2 kg (124 lb)   SpO2 95%   BMI 20.63 kg/m    Exam:  GENERAL APPEARANCE:  Alert, in no distress, oriented  ENT:  Mouth and posterior oropharynx normal, moist mucous membranes, normal hearing acuity  EYES:  EOM normal, conjunctiva and lids normal  RESP:  no respiratory distress  CV:  Palpation and auscultation of heart done , regular rate and rhythm, no murmur, rub, or gallop, no edema  SKIN:  Inspection of skin and subcutaneous tissue baseline, Palpation of skin and subcutaneous tissue baseline  PSYCH:  oriented X 3, affect and mood normal    Lab/Diagnostic data:  Recent labs in [facility] reviewed by me today.       ASSESSMENT/PLAN:  (S32.10XS) Closed fracture of sacrum, unspecified portion of sacrum, sequela  (primary encounter diagnosis)  Comment: Due to prolonged physical exertion with osteoporosis. Pain reasonably controlled. This is expected to last a few weeks. How long she will remain in TCU is dependent on how well progresses with therapy  Plan: Continue current POC with no changes at this time. PT/OT eval and treat, discharge planning per their recommendations.    (M80.00XS) Age-related osteoporosis with current pathological fracture, sequela  Comment: Patient is on fosamax and calcium. It appears " these were started maybe 2019.   Plan: Continue current POC with no changes at this time and adjustments as needed.    (I10) Essential hypertension  Comment: Chronic, controlled  Plan: Continue current POC with no changes at this time and adjustments as needed.    (I50.22) Chronic systolic heart failure (H)  Comment: Chronic: CHF due to effects of HTN/Heart Disease. Currently: compensated.  Plan: Monitor for shortness of breath, wheezing, increasing lower extremity edema and change in activity tolerance.     (I25.10) Coronary artery disease involving native coronary artery of native heart without angina pectoris  Comment: Asymptomatic  Plan: Continue current POC with no changes at this time and adjustments as needed.    (J44.9) Chronic obstructive pulmonary disease, unspecified COPD type (H)  Comment: Chronic condition being managed with medications and is currently asymptomatic.  Plan: Continue current POC with no changes at this time and adjustments as needed.    (I48.91) Atrial fibrillation, unspecified type (H)  (Z79.01) Chronic anticoagulation  Comment: s/p pacemaker. HR controlled. Could potentially reduce BB. On warfarin. Will be managed by anticoagulation clinic. INR goal 2-3  Plan: Continue current POC with no changes at this time and adjustments as needed.      Electronically signed by:  KARAN Fraser CNP   [facility]  Phone: [phone]                         Sincerely,        KARAN Fraser CNP

## 2022-09-12 NOTE — PROGRESS NOTES
ANTICOAGULATION MANAGEMENT     Jori Humphreys 86 year old female is on warfarin with subtherapeutic INR result. (Goal INR 2.0-3.0)    Recent labs: (last 7 days)     09/12/22  0000   INR 1.8*       ASSESSMENT       Source(s): Chart Review and Home Care/Facility Nurse       Warfarin doses taken: While hospitalized on 9/3-9/8: held and administered differently based on the patient's INR result.  Discharged on: 3 mg daily and recheck on 9/12    Diet: No new diet changes identified    New illness, injury, or hospitalization: Yes: hospitalized 9/3-9/8 for bilateral sacral alar fracture    Medication/supplement changes: None noted    Signs or symptoms of bleeding or clotting: No    Previous INR: Supratherapeutic 3.13 on 9/7/22    Additional findings: None       PLAN     Recommended plan for no diet, medication or health factor changes affecting INR     Dosing Instructions: Increase your warfarin dose (10% change) with next INR in 3 days       Summary  As of 9/12/2022    Full warfarin instructions:  4 mg every Mon; 3 mg all other days   Next INR check:  9/15/2022             Telephone call with [facility] nurse (medical care for seniors) who verbalizes understanding and agrees to plan    Orders given to  Homecare nurse/facility to recheck    Education provided: Please call back if any changes to your diet, medications or how you've been taking warfarin    Plan made per [facility] anticoagulation protocol    Shobha Ontiveros, RN  Anticoagulation Clinic  9/12/2022    _______________________________________________________________________     Anticoagulation Episode Summary     Current INR goal:  2.0-3.0   TTR:  --   Target end date:     Send INR reminders to:  [facility] (U/LTC/long-term)       Comments:  [facility] [phone]

## 2022-09-12 NOTE — TELEPHONE ENCOUNTER
ANTICOAGULATION CLINIC REFERRAL RENEWAL REQUEST       An annual renewal order is required for all patients referred to [facility] Anticoagulation Clinic.?  Please review and sign the pended referral order for Jori Humphreys.       ANTICOAGULATION SUMMARY      Warfarin indication(s)   Atrial Fibrillation    Mechanical heart valve present?  NO       Current goal range   INR: 2.0-3.0     Goal appropriate for indication? Goal INR 2-3, standard for indication(s) above     Time in Therapeutic Range (TTR)  (Goal > 60%) N/A new admit to [facility]      Office visit with referring provider's group within last year NA-new admit to Nir Ontiveros RN  [facility] Anticoagulation Clinic

## 2022-09-12 NOTE — PROGRESS NOTES
[facility] GERIATRICS    PRIMARY CARE PROVIDER AND CLINIC:  Tawana Archer MD, [facility] [address]  / [place] [address]  Chief Complaint   Patient presents with     [facility] Medical Record Number:  [id number]  Place of Service where encounter took place:  [facility] ([facility]) [[id number]]    Jori Humphreys  is a 86 year old  (1935), admitted to the above facility from  [facility]. Hospital stay 9/3/22 through 9/8/22.. Patient with PMH cardiomyopathy, CHF, COPD, afib, and HTN presented with persistent low back and buttock pain. She was found to have sacral alar fractures. She did not have a fall, but has been busy packing and moving things to sell her house.     HPI:    Patient reports ongoing pain, mostly with movement. No other physical complaints. She has been feeling more depressed recently, but does not want any more medication. She would prefer to have less medication.   -140s/80-90s  HR 60s    CODE STATUS/ADVANCE DIRECTIVES DISCUSSION:  DNR / DNI  ALLERGIES:   Allergies   Allergen Reactions     Alendronic Acid      Other reaction(s): 6/08, stomach bothered so quit early June     Codeine Nausea     Risedronate      Other reaction(s): 8/3/09 can't tolerate,  stomach upset      PAST MEDICAL HISTORY:   Past Medical History:   Diagnosis Date     COPD (chronic obstructive pulmonary disease) (H)       PAST SURGICAL HISTORY:   has a past surgical history that includes knee surgery (Left, 1997); Arthroscopy shoulder rotator cuff repair; Cataract Extraction (Bilateral); Release carpal tunnel (2011); Release trigger finger; other surgical history (2008); Pr Temporal Artery Ligatn Or Bx (Bilateral, 12/10/2014); Pr Shldr Arthroscop,Surg,W/Rotat Cuff Repr (Left, 8/24/2018); Pr Incise Finger Tendon Sheath (Left, 8/24/2018); and REVISE MEDIAN N/CARPAL TUNNEL SURG (Left, 11/8/2019).  FAMILY HISTORY: family history is not on  file.  SOCIAL HISTORY:   reports that she quit smoking about 5 years ago. Her smoking use included cigarettes. She has a 30.00 pack-year smoking history. She has never used smokeless tobacco. She reports current alcohol use of about 4.2 standard drinks of alcohol per week.  Patient's living condition: lives alone    Post Discharge Medication Reconciliation Status:   Post Medication Reconciliation Status: Discharge medications reconciled, continue medications without change         Current Outpatient Medications   Medication Sig     acetaminophen (TYLENOL) 325 MG tablet Take 650 mg by mouth every 6 hours as needed for mild pain     alendronate (FOSAMAX) 70 MG tablet Take 70 mg by mouth every 7 days. Take in the morning on an empty stomach with a full glass of water 30 minutes before food     aspirin 81 MG EC tablet Take 81 mg by mouth daily     budesonide-formoterol (SYMBICORT) 80-4.5 MCG/ACT Inhaler Inhale 2 puffs into the lungs 2 times daily as needed for shortness of breath / dyspnea      calcium carbonate-vitamin D (OSCAL W/D) 500-200 MG-UNIT tablet Take 1 tablet by mouth 2 times daily     furosemide (LASIX) 20 MG tablet Take 40 mg by mouth daily     ibuprofen (ADVIL/MOTRIN) 200 MG tablet Take 400 mg by mouth every 6 hours as needed for mild pain     lidocaine (LIDODERM) 5 % patch Place 1 patch onto the skin every 24 hours To prevent lidocaine toxicity, patient should be patch free for 12 hrs daily.     lisinopril (ZESTRIL) 20 MG tablet Take 20 mg by mouth every morning     metoprolol succinate ER (TOPROL XL) 100 MG 24 hr tablet Take 100 mg by mouth every morning     Multiple Vitamins-Minerals (PRESERVISION AREDS PO) Take 1 tablet by mouth 2 times daily      omeprazole (PRILOSEC) 20 MG DR capsule Take 20 mg by mouth daily     oxyCODONE (ROXICODONE) 5 MG tablet Take 0.5-1 tablets (2.5-5 mg) by mouth every 3 hours as needed for severe pain or moderate to severe pain     Polyvinyl Alcohol-Povidone (REFRESH OP) Apply  "1 drop to eye as needed.     senna-docusate (SENOKOT-S/PERICOLACE) 8.6-50 MG tablet Take 1 tablet by mouth 2 times daily     sertraline (ZOLOFT) 25 MG tablet Take 25 mg by mouth daily      Warfarin Sodium (COUMADIN PO) Per INR orders     No current facility-administered medications for this visit.       ROS:  10 point ROS of systems including Constitutional, Eyes, Respiratory, Cardiovascular, Gastroenterology, Genitourinary, Integumentary, Musculoskeletal, Psychiatric were all negative except for pertinent positives noted in my HPI.    Vitals:  BP (!) 134/92   Pulse 67   Temp 98.3  F (36.8  C)   Resp 18   Ht 1.651 m (5' 5\")   Wt 56.2 kg (124 lb)   SpO2 95%   BMI 20.63 kg/m    Exam:  GENERAL APPEARANCE:  Alert, in no distress, oriented  ENT:  Mouth and posterior oropharynx normal, moist mucous membranes, normal hearing acuity  EYES:  EOM normal, conjunctiva and lids normal  RESP:  no respiratory distress  CV:  Palpation and auscultation of heart done , regular rate and rhythm, no murmur, rub, or gallop, no edema  SKIN:  Inspection of skin and subcutaneous tissue baseline, Palpation of skin and subcutaneous tissue baseline  PSYCH:  oriented X 3, affect and mood normal    Lab/Diagnostic data:  Recent labs in [facility] reviewed by me today.       ASSESSMENT/PLAN:  (S32.10XS) Closed fracture of sacrum, unspecified portion of sacrum, sequela  (primary encounter diagnosis)  Comment: Due to prolonged physical exertion with osteoporosis. Pain reasonably controlled. This is expected to last a few weeks. How long she will remain in TCU is dependent on how well progresses with therapy  Plan: Continue current POC with no changes at this time. PT/OT eval and treat, discharge planning per their recommendations.    (M80.00XS) Age-related osteoporosis with current pathological fracture, sequela  Comment: Patient is on fosamax and calcium. It appears these were started maybe 2019.   Plan: Continue current POC with no changes at " this time and adjustments as needed.    (I10) Essential hypertension  Comment: Chronic, controlled  Plan: Continue current POC with no changes at this time and adjustments as needed.    (I50.22) Chronic systolic heart failure (H)  Comment: Chronic: CHF due to effects of HTN/Heart Disease. Currently: compensated.  Plan: Monitor for shortness of breath, wheezing, increasing lower extremity edema and change in activity tolerance.     (I25.10) Coronary artery disease involving native coronary artery of native heart without angina pectoris  Comment: Asymptomatic  Plan: Continue current POC with no changes at this time and adjustments as needed.    (J44.9) Chronic obstructive pulmonary disease, unspecified COPD type (H)  Comment: Chronic condition being managed with medications and is currently asymptomatic.  Plan: Continue current POC with no changes at this time and adjustments as needed.    (I48.91) Atrial fibrillation, unspecified type (H)  (Z79.01) Chronic anticoagulation  Comment: s/p pacemaker. HR controlled. Could potentially reduce BB. On warfarin. Will be managed by anticoagulation clinic. INR goal 2-3  Plan: Continue current POC with no changes at this time and adjustments as needed.      Electronically signed by:  KARAN Fraser [facility]  Phone: [phone]

## 2022-09-12 NOTE — PROGRESS NOTES
A duplicate encounter was made.  See other anticoagulation encounter.    EDUARDO VallesN, RN  Anticoagulation Clinic

## 2022-09-14 ENCOUNTER — LAB REQUISITION (OUTPATIENT)
Dept: LAB | Facility: CLINIC | Age: 87
End: 2022-09-14
Payer: COMMERCIAL

## 2022-09-14 DIAGNOSIS — S32.120D: ICD-10-CM

## 2022-09-14 DIAGNOSIS — M80.08XD AGE-RELATED OSTEOPOROSIS WITH CURRENT PATHOLOGICAL FRACTURE, VERTEBRA(E), SUBSEQUENT ENCOUNTER FOR FRACTURE WITH ROUTINE HEALING: ICD-10-CM

## 2022-09-15 ENCOUNTER — ANTICOAGULATION THERAPY VISIT (OUTPATIENT)
Dept: ANTICOAGULATION | Facility: CLINIC | Age: 87
End: 2022-09-15

## 2022-09-15 LAB — INR (EXTERNAL): 2 (ref 0.9–1.1)

## 2022-09-15 NOTE — PROGRESS NOTES
ANTICOAGULATION MANAGEMENT     Jori Humphreys 86 year old female is on warfarin with therapeutic INR result. (Goal INR 2.0-3.0)    Recent labs: (last 7 days)     09/15/22  0000   INR 2.0*       ASSESSMENT       Source(s): Chart Review    Previous INR was Subtherapeutic     Pt was hospitalized 9/3/22-9/8/22 for Bilateral Sacral Alar fractures. [facility] Left detailed voicemail message with dosing and when to recheck INR. Nursing to call back with questions, updates or concerns.           PLAN       Dosing Instructions: Increase your warfarin dose (4.5% change) with next INR in 5 days       Summary  As of 9/15/2022    Full warfarin instructions:  4 mg every Mon, Thu; 3 mg all other days   Next INR check:  9/20/2022             Detailed voice message left for [facility] at [phone] facility nurse with dosing instructions and follow up date.     Orders given to  Homecare nurse/facility to recheck    Education provided: Importance of notifying clinic for changes in medications; a sooner lab recheck maybe needed. and Contact [phone]  with any changes, questions or concerns.     Plan made with [facility] Pharmacist Modesta Tejada, RN  Anticoagulation Clinic  9/15/2022    _______________________________________________________________________     Anticoagulation Episode Summary     Current INR goal:  2.0-3.0   TTR:  --   Target end date:     Send INR reminders to:  [facility] (TCU/LTC/long term)       Comments:  [facility] [phone]

## 2022-09-15 NOTE — PROGRESS NOTES
Received a fax INR result for Jori from [facility]    INR result dated 9/15/2022 is 2.0    Per fax template patient had 4 mg Monday and 3 mg Tues/Wed  On 81 mg ASA    Shobha MOLINA RN, BSN  Anticoagulation Clinic

## 2022-09-19 ENCOUNTER — TRANSITIONAL CARE UNIT VISIT (OUTPATIENT)
Dept: GERIATRICS | Facility: CLINIC | Age: 87
End: 2022-09-19
Payer: COMMERCIAL

## 2022-09-19 VITALS
WEIGHT: 122.8 LBS | BODY MASS INDEX: 20.46 KG/M2 | RESPIRATION RATE: 18 BRPM | DIASTOLIC BLOOD PRESSURE: 64 MMHG | TEMPERATURE: 97.3 F | HEIGHT: 65 IN | OXYGEN SATURATION: 95 % | SYSTOLIC BLOOD PRESSURE: 126 MMHG | HEART RATE: 69 BPM

## 2022-09-19 DIAGNOSIS — M80.00XS AGE-RELATED OSTEOPOROSIS WITH CURRENT PATHOLOGICAL FRACTURE, SEQUELA: ICD-10-CM

## 2022-09-19 DIAGNOSIS — I48.91 ATRIAL FIBRILLATION, UNSPECIFIED TYPE (H): ICD-10-CM

## 2022-09-19 DIAGNOSIS — Z79.01 CHRONIC ANTICOAGULATION: ICD-10-CM

## 2022-09-19 DIAGNOSIS — J44.9 CHRONIC OBSTRUCTIVE PULMONARY DISEASE, UNSPECIFIED COPD TYPE (H): ICD-10-CM

## 2022-09-19 DIAGNOSIS — I10 ESSENTIAL HYPERTENSION: ICD-10-CM

## 2022-09-19 DIAGNOSIS — S32.10XS CLOSED FRACTURE OF SACRUM, UNSPECIFIED PORTION OF SACRUM, SEQUELA: Primary | ICD-10-CM

## 2022-09-19 DIAGNOSIS — I25.10 CORONARY ARTERY DISEASE INVOLVING NATIVE CORONARY ARTERY OF NATIVE HEART WITHOUT ANGINA PECTORIS: ICD-10-CM

## 2022-09-19 DIAGNOSIS — I50.22 CHRONIC SYSTOLIC HEART FAILURE (H): ICD-10-CM

## 2022-09-19 PROCEDURE — 99309 SBSQ NF CARE MODERATE MDM 30: CPT | Performed by: NURSE PRACTITIONER

## 2022-09-19 NOTE — PROGRESS NOTES
"[facility] GERIATRICS    Chief Complaint   Patient presents with     RECHECK     HPI:  Jori Humphreys is a 86 year old  (1935), who is being seen today for an episodic care visit at: [facility] () [[id number]]. Admitted to the above facility from  [facility]. Hospital stay 9/3/22 through 9/8/22. Patient with PMH cardiomyopathy, CHF, COPD, afib, and HTN presented with persistent low back and buttock pain. She was found to have sacral alar fractures. She did not have a fall, but had been busy packing and moving things to sell her house.     Today's concern is:   States pain is mild and occurs primarily after prolonged inactivity such as sitting too long or laying in bed overnight. No pain with ambulation. Reports taking tylenol and a little oxycodone for pain control. Feels that PT is going well and that she is \"close to recovery.\" Her appetite continues to be poor which she partially attributes to the bland food d/t her low sodium diet. States her mood is \"not great\" when she's in pain but fine otherwise. Has no new concerns at this time.    Allergies, and PMH/PSH reviewed in [facility] today.  REVIEW OF SYSTEMS:  10 point ROS of systems including Constitutional, Eyes, Respiratory, Cardiovascular, Gastroenterology, Genitourinary, Integumentary, Musculoskeletal, Psychiatric were all negative except for pertinent positives noted in my HPI.    Objective:   /64   Pulse 69   Temp 97.3  F (36.3  C)   Resp 18   Ht 1.651 m (5' 5\")   Wt 55.7 kg (122 lb 12.8 oz)   SpO2 95%   BMI 20.43 kg/m    GENERAL APPEARANCE:  Alert, in no distress, cooperative  ENT:  Mouth and posterior oropharynx normal, moist mucous membranes, [place]  EYES:  EOM normal, conjunctiva and lids normal  RESP:  respiratory effort and palpation of chest normal, lungs clear to auscultation , no respiratory distress  CV:  Palpation and auscultation of heart done , regular rate and rhythm, no murmur, rub, " or gallop, no edema  ABDOMEN:  bowel sounds normal, soft, non-tender  SKIN:  Inspection of skin and subcutaneous tissue baseline  PSYCH:  oriented X 3, affect and mood normal    Recent labs in [facility] reviewed by me today.     Assessment/Plan:  (S32.10XS) Closed fracture of sacrum, unspecified portion of sacrum, sequela  (primary encounter diagnosis)  Comment: Pain controlled. Ambulating comfortably in halls. Progressing in therapy.  Plan: Continue current POC with no changes at this time. PT/OT eval and treat, discharge planning per their recommendations. Continue tylenol and oxycodone as needed for pain.    (M80.00XS) Age-related osteoporosis with current pathological fracture, sequela  Comment: On fosamax and calcium.  Plan: Continue current POC with no changes at this time and adjustments as needed    (I10) Essential hypertension  Comment: Chronic, controlled  Plan: Continue current POC with no changes at this time and adjustments as needed    (I50.22) Chronic systolic heart failure (H)  Comment: Chronic: CHF due to effects of HTN/Heart Disease. Currently: Compensated, asymptomatic.  Plan: Continue current POC with no changes at this time and adjustments as needed    (I25.10) Coronary artery disease involving native coronary artery of native heart without angina pectoris  Comment: Chronic, asymptomatic  Plan: Continue current POC with no changes at this time and adjustments as needed    (J44.9) Chronic obstructive pulmonary disease, unspecified COPD type (H)  Comment: Chronic. Asymptomatic on current medication regimen.  Plan: Continue current POC with no changes at this time and adjustments as needed    (I48.91) Atrial fibrillation, unspecified type (H)  (Z79.01) Chronic anticoagulation  Comment: S/p pacemaker. HR controlled. On warfarin. Will be managed by anticoagulation clinic. INR goal 2-3  Plan: Continue current POC with no changes at this time and adjustments as needed.       Lori Oviedo, Student Nurse  Practitioner    I was present with the student who participated in the service and in the documentation of the note. I have verified the history and personally performed the physical exam and medical decision-making. I agree with the assessment and plan of care as documented in the note.   KARAN Fraser CNP on 9/19/2022 at 2:57 PM      Addendum:  After visit, social work advised provider that patient will be discharging home 9/22/22    Documentation of Face to Face and Certification for Home Health Services    I certify that services are/were furnished while this patient was under the care of a physician and that a physician or an allowed non-physician practitioner (NPP), had a face-to-face encounter that meets the physician face-to-face encounter requirements. The encounter was in whole, or in part, related to the primary reason for home health. The patient is confined to his/her home and needs intermittent skilled nursing, physical therapy, speech-language pathology, or the continued need for occupational therapy. A plan of care has been established by a physician and is periodically reviewed by a physician.  Date of Face-to-Face Encounter: 9/19/2022.    I certify that, based on my findings, the following services are medically necessary home health services: Nursing, Occupational Therapy and Physical Therapy.    My clinical findings support the need for the above skilled services because: Requires assistance of another person or specialized equipment to access medical services because patient: Is unable to walk greater than 150 feet without rest...    Patient to re-establish plan of care with their PCP within 7-10 days after leaving the facility to reestablish care.  Medicare certified PECOS provider: KARAN Fraser CNP  Date: September 20, 2022

## 2022-09-19 NOTE — LETTER
"    9/19/2022        RE: Jori Humphreys  [address] [address]        [facility] GERIATRICS    Chief Complaint   Patient presents with     RECHECK     HPI:  Jori Humphreys is a 86 year old  (1935), who is being seen today for an episodic care visit at: [facility] ([facility]) [[id number]]. Admitted to the above facility from  [facility]. Hospital stay 9/3/22 through 9/8/22. Patient with PMH cardiomyopathy, CHF, COPD, afib, and HTN presented with persistent low back and buttock pain. She was found to have sacral alar fractures. She did not have a fall, but had been busy packing and moving things to sell her house.     Today's concern is:   States pain is mild and occurs primarily after prolonged inactivity such as sitting too long or laying in bed overnight. No pain with ambulation. Reports taking tylenol and a little oxycodone for pain control. Feels that PT is going well and that she is \"close to recovery.\" Her appetite continues to be poor which she partially attributes to the bland food d/t her low sodium diet. States her mood is \"not great\" when she's in pain but fine otherwise. Has no new concerns at this time.    Allergies, and PMH/PSH reviewed in [facility] today.  REVIEW OF SYSTEMS:  10 point ROS of systems including Constitutional, Eyes, Respiratory, Cardiovascular, Gastroenterology, Genitourinary, Integumentary, Musculoskeletal, Psychiatric were all negative except for pertinent positives noted in my HPI.    Objective:   /64   Pulse 69   Temp 97.3  F (36.3  C)   Resp 18   Ht 1.651 m (5' 5\")   Wt 55.7 kg (122 lb 12.8 oz)   SpO2 95%   BMI 20.43 kg/m    GENERAL APPEARANCE:  Alert, in no distress, cooperative  ENT:  Mouth and posterior oropharynx normal, moist mucous membranes, [place]  EYES:  EOM normal, conjunctiva and lids normal  RESP:  respiratory effort and palpation of chest normal, lungs clear to auscultation , no respiratory distress  CV:  " Palpation and auscultation of heart done , regular rate and rhythm, no murmur, rub, or gallop, no edema  ABDOMEN:  bowel sounds normal, soft, non-tender  SKIN:  Inspection of skin and subcutaneous tissue baseline  PSYCH:  oriented X 3, affect and mood normal    Recent labs in [facility] reviewed by me today.     Assessment/Plan:  (S32.10XS) Closed fracture of sacrum, unspecified portion of sacrum, sequela  (primary encounter diagnosis)  Comment: Pain controlled. Ambulating comfortably in halls. Progressing in therapy.  Plan: Continue current POC with no changes at this time. PT/OT eval and treat, discharge planning per their recommendations. Continue tylenol and oxycodone as needed for pain.    (M80.00XS) Age-related osteoporosis with current pathological fracture, sequela  Comment: On fosamax and calcium.  Plan: Continue current POC with no changes at this time and adjustments as needed    (I10) Essential hypertension  Comment: Chronic, controlled  Plan: Continue current POC with no changes at this time and adjustments as needed    (I50.22) Chronic systolic heart failure (H)  Comment: Chronic: CHF due to effects of HTN/Heart Disease. Currently: Compensated, asymptomatic.  Plan: Continue current POC with no changes at this time and adjustments as needed    (I25.10) Coronary artery disease involving native coronary artery of native heart without angina pectoris  Comment: Chronic, asymptomatic  Plan: Continue current POC with no changes at this time and adjustments as needed    (J44.9) Chronic obstructive pulmonary disease, unspecified COPD type (H)  Comment: Chronic. Asymptomatic on current medication regimen.  Plan: Continue current POC with no changes at this time and adjustments as needed    (I48.91) Atrial fibrillation, unspecified type (H)  (Z79.01) Chronic anticoagulation  Comment: S/p pacemaker. HR controlled. On warfarin. Will be managed by anticoagulation clinic. INR goal 2-3  Plan: Continue current POC with no  changes at this time and adjustments as needed.       Lori Oviedo, Student Nurse Practitioner    I was present with the student who participated in the service and in the documentation of the note. I have verified the history and personally performed the physical exam and medical decision-making. I agree with the assessment and plan of care as documented in the note.   KARAN Fraser CNP on 9/19/2022 at 2:57 PM      Addendum:  After visit, social work advised provider that patient will be discharging home 9/22/22    Documentation of Face to Face and Certification for Home Health Services    I certify that services are/were furnished while this patient was under the care of a physician and that a physician or an allowed non-physician practitioner (NPP), had a face-to-face encounter that meets the physician face-to-face encounter requirements. The encounter was in whole, or in part, related to the primary reason for home health. The patient is confined to his/her home and needs intermittent skilled nursing, physical therapy, speech-language pathology, or the continued need for occupational therapy. A plan of care has been established by a physician and is periodically reviewed by a physician.  Date of Face-to-Face Encounter: 9/19/2022.    I certify that, based on my findings, the following services are medically necessary home health services: Nursing, Occupational Therapy and Physical Therapy.    My clinical findings support the need for the above skilled services because: Requires assistance of another person or specialized equipment to access medical services because patient: Is unable to walk greater than 150 feet without rest...    Patient to re-establish plan of care with their PCP within 7-10 days after leaving the facility to reestablish care.  Medicare certified PECOS provider: KARAN Fraser CNP  Date: September 20, 2022              Sincerely,        KARAN Fraser CNP

## 2022-09-20 ENCOUNTER — ANTICOAGULATION THERAPY VISIT (OUTPATIENT)
Dept: ANTICOAGULATION | Facility: CLINIC | Age: 87
End: 2022-09-20

## 2022-09-20 LAB — INR (EXTERNAL): 2.4 (ref 0.9–1.1)

## 2022-09-20 NOTE — PROGRESS NOTES
Incoming fax    Receieved on 09/20/22    From [facility]    INR 2.4    No Changes reported via template       Marichuy Monte RN, BSN, PHN

## 2022-09-20 NOTE — PROGRESS NOTES
ANTICOAGULATION MANAGEMENT     Jori Humphreys 86 year old female is on warfarin with therapeutic INR result. (Goal INR 2.0-3.0)    Recent labs: (last 7 days)     09/20/22  0000   INR 2.4*       ASSESSMENT       Source(s): Chart Review and Home Care/Facility Nurse       Warfarin doses taken: Warfarin taken as instructed    Diet: No new diet changes identified    New illness, injury, or hospitalization: No    Medication/supplement changes: None noted    Signs or symptoms of bleeding or clotting: No    Previous INR: Therapeutic last 2(+) visits    Additional findings: Patient will be discharging from TCU on 9/22/22       PLAN     Recommended plan for no diet, medication or health factor changes affecting INR     Dosing Instructions: Continue your current warfarin dose with next INR in 2 days       Summary  As of 9/20/2022    Full warfarin instructions:  4 mg every Mon, Thu; 3 mg all other days   Next INR check:  9/22/2022             Telephone call with [facility] nurse (medical care for seniors) who agrees to plan and repeated back plan correctly    Orders given to  Homecare nurse/facility to recheck    Education provided: Please call back if any changes to your diet, medications or how you've been taking warfarin    Plan made per [facility] anticoagulation protocol    Shobha Ontiveros, RN  Anticoagulation Clinic  9/20/2022    _______________________________________________________________________     Anticoagulation Episode Summary     Current INR goal:  2.0-3.0   TTR:  --   Target end date:     Send INR reminders to:  [facility] (U/C/[place])       Comments:  [facility] [phone]

## 2022-09-22 ENCOUNTER — ANTICOAGULATION THERAPY VISIT (OUTPATIENT)
Dept: ANTICOAGULATION | Facility: CLINIC | Age: 87
End: 2022-09-22

## 2022-09-22 DIAGNOSIS — I48.91 ATRIAL FIBRILLATION, UNSPECIFIED TYPE (H): Primary | ICD-10-CM

## 2022-09-22 LAB — INR (EXTERNAL): 2.7 (ref 0.9–1.1)

## 2022-09-22 NOTE — PROGRESS NOTES
ANTICOAGULATION MANAGEMENT     Jori Humphreys 86 year old female is on warfarin with therapeutic INR result. (Goal INR 2.0-3.0)    Recent labs: (last 7 days)     09/22/22  0000   INR 2.7*       ASSESSMENT       Source(s): Chart Review and Home Care/Facility Nurse       Warfarin doses taken: Warfarin taken as instructed    Diet: Poor appetite, refusing protein supplement drinks.    New illness, injury, or hospitalization: No    Medication/supplement changes: about 1 dose of tylenol/day.    Signs or symptoms of bleeding or clotting: No    Previous INR: Therapeutic last 2(+) visits    Additional findings: INR trending up.     Pt will discharge Jori Whittakerniko today. Izaiah was managing INR PTA.       PLAN     Recommended plan for ongoing change(s) affecting INR     Dosing Instructions: decrease your warfarin dose (4% change) with next INR in 4 days       Summary  As of 9/22/2022    Full warfarin instructions:  4 mg every Mon; 3 mg all other days   Next INR check:  9/26/2022             Telephone call with [facility] nurse ([facility]) who verbalizes understanding and agrees to plan    Orders given to  Homecare nurse/facility to recheck    Education provided: Goal range and significance of current result    Plan made with [facility] Pharmacist Modesta Mendes, RN  Anticoagulation Clinic  9/22/2022    _______________________________________________________________________     Anticoagulation Episode Summary     Current INR goal:  2.0-3.0   TTR:  --   Target end date:  10/10/2022   Send INR reminders to:  [facility] ([facility])    Indications    Atrial fibrillation  unspecified type (H) [I48.91]           Comments:  [facility] [phone]         Anticoagulation Care Providers     Provider Role Specialty Phone number    Alexis Greco DO Referring Family Medicine [phone]

## 2022-09-22 NOTE — PROGRESS NOTES
Incoming fax from [facility]    Result date: 09/22/22  INR: 2.7    No changes per flowsheet.

## 2022-09-26 ENCOUNTER — TELEPHONE (OUTPATIENT)
Dept: ANTICOAGULATION | Facility: CLINIC | Age: 87
End: 2022-09-26

## 2022-09-26 DIAGNOSIS — I48.91 ATRIAL FIBRILLATION, UNSPECIFIED TYPE (H): Primary | ICD-10-CM

## 2022-09-26 NOTE — TELEPHONE ENCOUNTER
ANTICOAGULATION  MANAGEMENT    Jori Humphreys is being discharged from the [facility] ([facility]).    Reason for discharge: discharged from [facility]; returning to pre-admission warfarin management    Anticoagulation episode resolved    If patient needs warfarin management in the future, please send a new referral    Mike Mendes RN